# Patient Record
Sex: MALE | Race: WHITE | NOT HISPANIC OR LATINO | Employment: OTHER | URBAN - METROPOLITAN AREA
[De-identification: names, ages, dates, MRNs, and addresses within clinical notes are randomized per-mention and may not be internally consistent; named-entity substitution may affect disease eponyms.]

---

## 2022-01-01 ENCOUNTER — PATIENT OUTREACH (OUTPATIENT)
Dept: CARE COORDINATION | Facility: CLINIC | Age: 87
End: 2022-01-01

## 2022-01-01 ENCOUNTER — APPOINTMENT (OUTPATIENT)
Dept: PHYSICAL THERAPY | Facility: CLINIC | Age: 87
DRG: 194 | End: 2022-01-01
Attending: INTERNAL MEDICINE
Payer: COMMERCIAL

## 2022-01-01 ENCOUNTER — APPOINTMENT (OUTPATIENT)
Dept: SPEECH THERAPY | Facility: CLINIC | Age: 87
DRG: 194 | End: 2022-01-01
Attending: HOSPITALIST
Payer: COMMERCIAL

## 2022-01-01 ENCOUNTER — HOSPITAL ENCOUNTER (INPATIENT)
Facility: CLINIC | Age: 87
LOS: 5 days | Discharge: HOSPICE | DRG: 194 | End: 2022-08-10
Attending: EMERGENCY MEDICINE | Admitting: INTERNAL MEDICINE
Payer: COMMERCIAL

## 2022-01-01 ENCOUNTER — APPOINTMENT (OUTPATIENT)
Dept: GENERAL RADIOLOGY | Facility: CLINIC | Age: 87
DRG: 194 | End: 2022-01-01
Payer: COMMERCIAL

## 2022-01-01 ENCOUNTER — APPOINTMENT (OUTPATIENT)
Dept: MRI IMAGING | Facility: CLINIC | Age: 87
DRG: 194 | End: 2022-01-01
Attending: HOSPITALIST
Payer: COMMERCIAL

## 2022-01-01 VITALS
TEMPERATURE: 98.2 F | SYSTOLIC BLOOD PRESSURE: 146 MMHG | HEIGHT: 67 IN | HEART RATE: 69 BPM | OXYGEN SATURATION: 94 % | DIASTOLIC BLOOD PRESSURE: 106 MMHG | RESPIRATION RATE: 18 BRPM | WEIGHT: 255.29 LBS | BODY MASS INDEX: 40.07 KG/M2

## 2022-01-01 DIAGNOSIS — R09.02 HYPOXIA: ICD-10-CM

## 2022-01-01 DIAGNOSIS — Z71.89 OTHER SPECIFIED COUNSELING: ICD-10-CM

## 2022-01-01 DIAGNOSIS — Z51.5 COMFORT MEASURES ONLY STATUS: Primary | ICD-10-CM

## 2022-01-01 DIAGNOSIS — J18.9 PNEUMONIA OF LEFT LOWER LOBE DUE TO INFECTIOUS ORGANISM: ICD-10-CM

## 2022-01-01 LAB
ANION GAP SERPL CALCULATED.3IONS-SCNC: 5 MMOL/L (ref 3–14)
ANION GAP SERPL CALCULATED.3IONS-SCNC: 6 MMOL/L (ref 3–14)
ANION GAP SERPL CALCULATED.3IONS-SCNC: 6 MMOL/L (ref 3–14)
ATRIAL RATE - MUSE: 75 BPM
BASOPHILS # BLD AUTO: 0.1 10E3/UL (ref 0–0.2)
BASOPHILS NFR BLD AUTO: 1 %
BUN SERPL-MCNC: 13 MG/DL (ref 7–30)
BUN SERPL-MCNC: 14 MG/DL (ref 7–30)
BUN SERPL-MCNC: 16 MG/DL (ref 7–30)
CALCIUM SERPL-MCNC: 8.6 MG/DL (ref 8.5–10.1)
CALCIUM SERPL-MCNC: 8.8 MG/DL (ref 8.5–10.1)
CALCIUM SERPL-MCNC: 8.9 MG/DL (ref 8.5–10.1)
CHLORIDE BLD-SCNC: 109 MMOL/L (ref 94–109)
CHLORIDE BLD-SCNC: 109 MMOL/L (ref 94–109)
CHLORIDE BLD-SCNC: 110 MMOL/L (ref 94–109)
CO2 SERPL-SCNC: 24 MMOL/L (ref 20–32)
CO2 SERPL-SCNC: 26 MMOL/L (ref 20–32)
CO2 SERPL-SCNC: 27 MMOL/L (ref 20–32)
CREAT SERPL-MCNC: 0.92 MG/DL (ref 0.66–1.25)
CREAT SERPL-MCNC: 0.97 MG/DL (ref 0.66–1.25)
CREAT SERPL-MCNC: 0.98 MG/DL (ref 0.66–1.25)
CREAT SERPL-MCNC: 1.01 MG/DL (ref 0.66–1.25)
CREAT SERPL-MCNC: 1.02 MG/DL (ref 0.66–1.25)
CREAT SERPL-MCNC: 1.11 MG/DL (ref 0.66–1.25)
DIASTOLIC BLOOD PRESSURE - MUSE: NORMAL MMHG
EOSINOPHIL # BLD AUTO: 0.2 10E3/UL (ref 0–0.7)
EOSINOPHIL NFR BLD AUTO: 3 %
ERYTHROCYTE [DISTWIDTH] IN BLOOD BY AUTOMATED COUNT: 13.8 % (ref 10–15)
ERYTHROCYTE [DISTWIDTH] IN BLOOD BY AUTOMATED COUNT: 13.8 % (ref 10–15)
ERYTHROCYTE [DISTWIDTH] IN BLOOD BY AUTOMATED COUNT: 13.9 % (ref 10–15)
FLUAV RNA SPEC QL NAA+PROBE: NEGATIVE
FLUBV RNA RESP QL NAA+PROBE: NEGATIVE
GFR SERPL CREATININE-BSD FRML MDRD: 65 ML/MIN/1.73M2
GFR SERPL CREATININE-BSD FRML MDRD: 72 ML/MIN/1.73M2
GFR SERPL CREATININE-BSD FRML MDRD: 72 ML/MIN/1.73M2
GFR SERPL CREATININE-BSD FRML MDRD: 75 ML/MIN/1.73M2
GFR SERPL CREATININE-BSD FRML MDRD: 76 ML/MIN/1.73M2
GFR SERPL CREATININE-BSD FRML MDRD: 81 ML/MIN/1.73M2
GLUCOSE BLD-MCNC: 104 MG/DL (ref 70–99)
GLUCOSE BLD-MCNC: 112 MG/DL (ref 70–99)
GLUCOSE BLD-MCNC: 99 MG/DL (ref 70–99)
HCO3 BLDV-SCNC: 29 MMOL/L (ref 21–28)
HCT VFR BLD AUTO: 42.1 % (ref 40–53)
HCT VFR BLD AUTO: 42.3 % (ref 40–53)
HCT VFR BLD AUTO: 43.1 % (ref 40–53)
HGB BLD-MCNC: 13.5 G/DL (ref 13.3–17.7)
HGB BLD-MCNC: 13.8 G/DL (ref 13.3–17.7)
HGB BLD-MCNC: 13.8 G/DL (ref 13.3–17.7)
IMM GRANULOCYTES # BLD: 0.1 10E3/UL
IMM GRANULOCYTES NFR BLD: 1 %
INTERPRETATION ECG - MUSE: NORMAL
LACTATE BLD-SCNC: 0.7 MMOL/L
LYMPHOCYTES # BLD AUTO: 1.1 10E3/UL (ref 0.8–5.3)
LYMPHOCYTES NFR BLD AUTO: 15 %
MCH RBC QN AUTO: 28.3 PG (ref 26.5–33)
MCH RBC QN AUTO: 28.5 PG (ref 26.5–33)
MCH RBC QN AUTO: 28.5 PG (ref 26.5–33)
MCHC RBC AUTO-ENTMCNC: 32 G/DL (ref 31.5–36.5)
MCHC RBC AUTO-ENTMCNC: 32.1 G/DL (ref 31.5–36.5)
MCHC RBC AUTO-ENTMCNC: 32.6 G/DL (ref 31.5–36.5)
MCV RBC AUTO: 87 FL (ref 78–100)
MCV RBC AUTO: 89 FL (ref 78–100)
MCV RBC AUTO: 89 FL (ref 78–100)
MONOCYTES # BLD AUTO: 0.7 10E3/UL (ref 0–1.3)
MONOCYTES NFR BLD AUTO: 10 %
MRSA DNA SPEC QL NAA+PROBE: NEGATIVE
NEUTROPHILS # BLD AUTO: 5.3 10E3/UL (ref 1.6–8.3)
NEUTROPHILS NFR BLD AUTO: 70 %
NRBC # BLD AUTO: 0 10E3/UL
NRBC BLD AUTO-RTO: 0 /100
NT-PROBNP SERPL-MCNC: 910 PG/ML (ref 0–1800)
P AXIS - MUSE: NORMAL DEGREES
PCO2 BLDV: 38 MM HG (ref 40–50)
PH BLDV: 7.5 [PH] (ref 7.32–7.43)
PLATELET # BLD AUTO: 164 10E3/UL (ref 150–450)
PLATELET # BLD AUTO: 168 10E3/UL (ref 150–450)
PLATELET # BLD AUTO: 168 10E3/UL (ref 150–450)
PLATELET # BLD AUTO: 182 10E3/UL (ref 150–450)
PO2 BLDV: 53 MM HG (ref 25–47)
POTASSIUM BLD-SCNC: 3.7 MMOL/L (ref 3.4–5.3)
POTASSIUM BLD-SCNC: 4 MMOL/L (ref 3.4–5.3)
POTASSIUM BLD-SCNC: 4.7 MMOL/L (ref 3.4–5.3)
PR INTERVAL - MUSE: NORMAL MS
QRS DURATION - MUSE: 88 MS
QT - MUSE: 428 MS
QTC - MUSE: 465 MS
R AXIS - MUSE: -2 DEGREES
RBC # BLD AUTO: 4.74 10E6/UL (ref 4.4–5.9)
RBC # BLD AUTO: 4.84 10E6/UL (ref 4.4–5.9)
RBC # BLD AUTO: 4.87 10E6/UL (ref 4.4–5.9)
RSV RNA SPEC NAA+PROBE: NEGATIVE
SA TARGET DNA: NEGATIVE
SAO2 % BLDV: 90 % (ref 94–100)
SARS-COV-2 RNA RESP QL NAA+PROBE: NEGATIVE
SODIUM SERPL-SCNC: 139 MMOL/L (ref 133–144)
SODIUM SERPL-SCNC: 141 MMOL/L (ref 133–144)
SODIUM SERPL-SCNC: 142 MMOL/L (ref 133–144)
SYSTOLIC BLOOD PRESSURE - MUSE: NORMAL MMHG
T AXIS - MUSE: 56 DEGREES
TROPONIN I SERPL HS-MCNC: 19 NG/L
VENTRICULAR RATE- MUSE: 71 BPM
WBC # BLD AUTO: 3.9 10E3/UL (ref 4–11)
WBC # BLD AUTO: 5 10E3/UL (ref 4–11)
WBC # BLD AUTO: 7.5 10E3/UL (ref 4–11)

## 2022-01-01 PROCEDURE — 120N000001 HC R&B MED SURG/OB

## 2022-01-01 PROCEDURE — 97530 THERAPEUTIC ACTIVITIES: CPT | Mod: GP

## 2022-01-01 PROCEDURE — 250N000013 HC RX MED GY IP 250 OP 250 PS 637: Performed by: INTERNAL MEDICINE

## 2022-01-01 PROCEDURE — 83605 ASSAY OF LACTIC ACID: CPT

## 2022-01-01 PROCEDURE — 255N000002 HC RX 255 OP 636: Performed by: INTERNAL MEDICINE

## 2022-01-01 PROCEDURE — 85049 AUTOMATED PLATELET COUNT: CPT | Performed by: INTERNAL MEDICINE

## 2022-01-01 PROCEDURE — 87637 SARSCOV2&INF A&B&RSV AMP PRB: CPT

## 2022-01-01 PROCEDURE — 85027 COMPLETE CBC AUTOMATED: CPT | Performed by: INTERNAL MEDICINE

## 2022-01-01 PROCEDURE — A9585 GADOBUTROL INJECTION: HCPCS | Performed by: INTERNAL MEDICINE

## 2022-01-01 PROCEDURE — 92610 EVALUATE SWALLOWING FUNCTION: CPT | Mod: GN | Performed by: SPEECH-LANGUAGE PATHOLOGIST

## 2022-01-01 PROCEDURE — 99233 SBSQ HOSP IP/OBS HIGH 50: CPT | Performed by: HOSPITALIST

## 2022-01-01 PROCEDURE — 71046 X-RAY EXAM CHEST 2 VIEWS: CPT

## 2022-01-01 PROCEDURE — 250N000011 HC RX IP 250 OP 636: Performed by: INTERNAL MEDICINE

## 2022-01-01 PROCEDURE — 82565 ASSAY OF CREATININE: CPT | Performed by: INTERNAL MEDICINE

## 2022-01-01 PROCEDURE — 85025 COMPLETE CBC W/AUTO DIFF WBC: CPT

## 2022-01-01 PROCEDURE — C9803 HOPD COVID-19 SPEC COLLECT: HCPCS

## 2022-01-01 PROCEDURE — 97161 PT EVAL LOW COMPLEX 20 MIN: CPT | Mod: GP

## 2022-01-01 PROCEDURE — 250N000013 HC RX MED GY IP 250 OP 250 PS 637: Performed by: HOSPITALIST

## 2022-01-01 PROCEDURE — 96365 THER/PROPH/DIAG IV INF INIT: CPT

## 2022-01-01 PROCEDURE — 258N000003 HC RX IP 258 OP 636: Performed by: INTERNAL MEDICINE

## 2022-01-01 PROCEDURE — 87641 MR-STAPH DNA AMP PROBE: CPT

## 2022-01-01 PROCEDURE — 99291 CRITICAL CARE FIRST HOUR: CPT | Mod: 25

## 2022-01-01 PROCEDURE — 36415 COLL VENOUS BLD VENIPUNCTURE: CPT | Performed by: INTERNAL MEDICINE

## 2022-01-01 PROCEDURE — 99239 HOSP IP/OBS DSCHRG MGMT >30: CPT | Mod: GW | Performed by: HOSPITALIST

## 2022-01-01 PROCEDURE — 80048 BASIC METABOLIC PNL TOTAL CA: CPT | Performed by: INTERNAL MEDICINE

## 2022-01-01 PROCEDURE — 93005 ELECTROCARDIOGRAM TRACING: CPT

## 2022-01-01 PROCEDURE — 258N000003 HC RX IP 258 OP 636: Performed by: EMERGENCY MEDICINE

## 2022-01-01 PROCEDURE — 99232 SBSQ HOSP IP/OBS MODERATE 35: CPT | Performed by: HOSPITALIST

## 2022-01-01 PROCEDURE — 84484 ASSAY OF TROPONIN QUANT: CPT

## 2022-01-01 PROCEDURE — 96375 TX/PRO/DX INJ NEW DRUG ADDON: CPT

## 2022-01-01 PROCEDURE — 250N000011 HC RX IP 250 OP 636

## 2022-01-01 PROCEDURE — 82803 BLOOD GASES ANY COMBINATION: CPT

## 2022-01-01 PROCEDURE — 80048 BASIC METABOLIC PNL TOTAL CA: CPT | Performed by: HOSPITALIST

## 2022-01-01 PROCEDURE — 85018 HEMOGLOBIN: CPT | Performed by: HOSPITALIST

## 2022-01-01 PROCEDURE — 99223 1ST HOSP IP/OBS HIGH 75: CPT | Mod: AI | Performed by: INTERNAL MEDICINE

## 2022-01-01 PROCEDURE — 36415 COLL VENOUS BLD VENIPUNCTURE: CPT

## 2022-01-01 PROCEDURE — 70553 MRI BRAIN STEM W/O & W/DYE: CPT

## 2022-01-01 PROCEDURE — 80048 BASIC METABOLIC PNL TOTAL CA: CPT

## 2022-01-01 PROCEDURE — 36415 COLL VENOUS BLD VENIPUNCTURE: CPT | Performed by: HOSPITALIST

## 2022-01-01 PROCEDURE — 250N000011 HC RX IP 250 OP 636: Performed by: EMERGENCY MEDICINE

## 2022-01-01 PROCEDURE — 83880 ASSAY OF NATRIURETIC PEPTIDE: CPT

## 2022-01-01 PROCEDURE — 82565 ASSAY OF CREATININE: CPT

## 2022-01-01 RX ORDER — ALBUTEROL SULFATE 0.83 MG/ML
2.5 SOLUTION RESPIRATORY (INHALATION) EVERY 4 HOURS PRN
COMMUNITY

## 2022-01-01 RX ORDER — TAMSULOSIN HYDROCHLORIDE 0.4 MG/1
0.4 CAPSULE ORAL DAILY
COMMUNITY

## 2022-01-01 RX ORDER — ATROPINE SULFATE 10 MG/ML
2 SOLUTION/ DROPS OPHTHALMIC EVERY 4 HOURS PRN
Status: DISCONTINUED | OUTPATIENT
Start: 2022-01-01 | End: 2022-01-01 | Stop reason: HOSPADM

## 2022-01-01 RX ORDER — SALIVA STIMULANT COMB. NO.3
2 SPRAY, NON-AEROSOL (ML) MUCOUS MEMBRANE
Status: DISCONTINUED | OUTPATIENT
Start: 2022-01-01 | End: 2022-01-01 | Stop reason: HOSPADM

## 2022-01-01 RX ORDER — SENNOSIDES 8.6 MG
650 CAPSULE ORAL 3 TIMES DAILY
COMMUNITY

## 2022-01-01 RX ORDER — DIAZEPAM 10 MG/2ML
5 INJECTION, SOLUTION INTRAMUSCULAR; INTRAVENOUS EVERY 6 HOURS PRN
Status: DISCONTINUED | OUTPATIENT
Start: 2022-01-01 | End: 2022-01-01 | Stop reason: HOSPADM

## 2022-01-01 RX ORDER — ACETAMINOPHEN 325 MG/1
650 TABLET ORAL EVERY 6 HOURS PRN
Status: DISCONTINUED | OUTPATIENT
Start: 2022-01-01 | End: 2022-01-01 | Stop reason: HOSPADM

## 2022-01-01 RX ORDER — LORAZEPAM 2 MG/ML
1 CONCENTRATE ORAL
Status: DISCONTINUED | OUTPATIENT
Start: 2022-01-01 | End: 2022-01-01 | Stop reason: HOSPADM

## 2022-01-01 RX ORDER — LORAZEPAM 0.5 MG/1
0.5 TABLET ORAL EVERY 4 HOURS PRN
Status: ON HOLD | COMMUNITY
Start: 2022-01-01 | End: 2022-01-01

## 2022-01-01 RX ORDER — ENOXAPARIN SODIUM 100 MG/ML
40 INJECTION SUBCUTANEOUS EVERY 24 HOURS
Status: DISCONTINUED | OUTPATIENT
Start: 2022-01-01 | End: 2022-01-01

## 2022-01-01 RX ORDER — PRAVASTATIN SODIUM 40 MG
40 TABLET ORAL AT BEDTIME
Status: ON HOLD | COMMUNITY
End: 2022-01-01

## 2022-01-01 RX ORDER — NALOXONE HYDROCHLORIDE 0.4 MG/ML
0.1 INJECTION, SOLUTION INTRAMUSCULAR; INTRAVENOUS; SUBCUTANEOUS
Status: DISCONTINUED | OUTPATIENT
Start: 2022-01-01 | End: 2022-01-01 | Stop reason: HOSPADM

## 2022-01-01 RX ORDER — TAMSULOSIN HYDROCHLORIDE 0.4 MG/1
0.4 CAPSULE ORAL DAILY
Status: DISCONTINUED | OUTPATIENT
Start: 2022-01-01 | End: 2022-01-01 | Stop reason: HOSPADM

## 2022-01-01 RX ORDER — NITROGLYCERIN 0.4 MG/1
0.4 TABLET SUBLINGUAL EVERY 5 MIN PRN
Status: DISCONTINUED | OUTPATIENT
Start: 2022-01-01 | End: 2022-01-01 | Stop reason: HOSPADM

## 2022-01-01 RX ORDER — PIPERACILLIN SODIUM, TAZOBACTAM SODIUM 4; .5 G/20ML; G/20ML
4.5 INJECTION, POWDER, LYOPHILIZED, FOR SOLUTION INTRAVENOUS ONCE
Status: COMPLETED | OUTPATIENT
Start: 2022-01-01 | End: 2022-01-01

## 2022-01-01 RX ORDER — DONEPEZIL HYDROCHLORIDE 10 MG/1
10 TABLET, FILM COATED ORAL AT BEDTIME
Status: ON HOLD | COMMUNITY
End: 2022-01-01

## 2022-01-01 RX ORDER — ONDANSETRON 4 MG/1
4 TABLET, ORALLY DISINTEGRATING ORAL EVERY 6 HOURS PRN
Status: DISCONTINUED | OUTPATIENT
Start: 2022-01-01 | End: 2022-01-01 | Stop reason: HOSPADM

## 2022-01-01 RX ORDER — NALOXONE HYDROCHLORIDE 0.4 MG/ML
0.2 INJECTION, SOLUTION INTRAMUSCULAR; INTRAVENOUS; SUBCUTANEOUS
Status: DISCONTINUED | OUTPATIENT
Start: 2022-01-01 | End: 2022-01-01 | Stop reason: HOSPADM

## 2022-01-01 RX ORDER — AMOXICILLIN 250 MG
1 CAPSULE ORAL 2 TIMES DAILY PRN
Status: DISCONTINUED | OUTPATIENT
Start: 2022-01-01 | End: 2022-01-01 | Stop reason: HOSPADM

## 2022-01-01 RX ORDER — CARBOXYMETHYLCELLULOSE SODIUM 5 MG/ML
2 SOLUTION/ DROPS OPHTHALMIC 2 TIMES DAILY PRN
Status: DISCONTINUED | OUTPATIENT
Start: 2022-01-01 | End: 2022-01-01 | Stop reason: HOSPADM

## 2022-01-01 RX ORDER — LOPERAMIDE HCL 2 MG
2 CAPSULE ORAL 4 TIMES DAILY PRN
COMMUNITY

## 2022-01-01 RX ORDER — LORAZEPAM 1 MG/1
1 TABLET ORAL
Qty: 10 TABLET | Refills: 0 | Status: SHIPPED | OUTPATIENT
Start: 2022-01-01

## 2022-01-01 RX ORDER — BISACODYL 10 MG
10 SUPPOSITORY, RECTAL RECTAL DAILY PRN
Status: DISCONTINUED | OUTPATIENT
Start: 2022-01-01 | End: 2022-01-01 | Stop reason: HOSPADM

## 2022-01-01 RX ORDER — GADOBUTROL 604.72 MG/ML
11 INJECTION INTRAVENOUS ONCE
Status: COMPLETED | OUTPATIENT
Start: 2022-01-01 | End: 2022-01-01

## 2022-01-01 RX ORDER — NITROGLYCERIN 0.4 MG/1
0.4 TABLET SUBLINGUAL EVERY 5 MIN PRN
Status: ON HOLD | COMMUNITY
End: 2022-01-01

## 2022-01-01 RX ORDER — HYDROMORPHONE HYDROCHLORIDE 1 MG/ML
1-2 SOLUTION ORAL
Status: DISCONTINUED | OUTPATIENT
Start: 2022-01-01 | End: 2022-01-01 | Stop reason: HOSPADM

## 2022-01-01 RX ORDER — BENZTROPINE MESYLATE 1 MG/1
1 TABLET ORAL 3 TIMES DAILY PRN
Status: DISCONTINUED | OUTPATIENT
Start: 2022-01-01 | End: 2022-01-01 | Stop reason: HOSPADM

## 2022-01-01 RX ORDER — GABAPENTIN 100 MG/1
200 CAPSULE ORAL 3 TIMES DAILY
Status: DISCONTINUED | OUTPATIENT
Start: 2022-01-01 | End: 2022-01-01 | Stop reason: HOSPADM

## 2022-01-01 RX ORDER — OLANZAPINE 10 MG/2ML
2.5 INJECTION, POWDER, FOR SOLUTION INTRAMUSCULAR EVERY 6 HOURS PRN
Status: DISCONTINUED | OUTPATIENT
Start: 2022-01-01 | End: 2022-01-01 | Stop reason: HOSPADM

## 2022-01-01 RX ORDER — METOPROLOL TARTRATE 25 MG/1
25 TABLET, FILM COATED ORAL 2 TIMES DAILY
Status: DISCONTINUED | OUTPATIENT
Start: 2022-01-01 | End: 2022-01-01

## 2022-01-01 RX ORDER — GABAPENTIN 100 MG/1
100 CAPSULE ORAL 4 TIMES DAILY PRN
COMMUNITY

## 2022-01-01 RX ORDER — ACETAMINOPHEN 650 MG/1
650 SUPPOSITORY RECTAL EVERY 6 HOURS PRN
Qty: 10 SUPPOSITORY | Refills: 0 | Status: SHIPPED | OUTPATIENT
Start: 2022-01-01

## 2022-01-01 RX ORDER — ACETAMINOPHEN 650 MG/1
650 SUPPOSITORY RECTAL EVERY 6 HOURS PRN
Status: DISCONTINUED | OUTPATIENT
Start: 2022-01-01 | End: 2022-01-01 | Stop reason: HOSPADM

## 2022-01-01 RX ORDER — ONDANSETRON 2 MG/ML
4 INJECTION INTRAMUSCULAR; INTRAVENOUS EVERY 6 HOURS PRN
Status: DISCONTINUED | OUTPATIENT
Start: 2022-01-01 | End: 2022-01-01 | Stop reason: HOSPADM

## 2022-01-01 RX ORDER — PIPERACILLIN SODIUM, TAZOBACTAM SODIUM 4; .5 G/20ML; G/20ML
4.5 INJECTION, POWDER, LYOPHILIZED, FOR SOLUTION INTRAVENOUS EVERY 6 HOURS
Status: DISCONTINUED | OUTPATIENT
Start: 2022-01-01 | End: 2022-01-01

## 2022-01-01 RX ORDER — DONEPEZIL HYDROCHLORIDE 10 MG/1
10 TABLET, FILM COATED ORAL AT BEDTIME
Status: DISCONTINUED | OUTPATIENT
Start: 2022-01-01 | End: 2022-01-01

## 2022-01-01 RX ORDER — AMOXICILLIN 250 MG
2 CAPSULE ORAL 2 TIMES DAILY PRN
Status: DISCONTINUED | OUTPATIENT
Start: 2022-01-01 | End: 2022-01-01 | Stop reason: HOSPADM

## 2022-01-01 RX ORDER — CARBOXYMETHYLCELLULOSE SODIUM 5 MG/ML
2 SOLUTION/ DROPS OPHTHALMIC 3 TIMES DAILY
Status: DISCONTINUED | OUTPATIENT
Start: 2022-01-01 | End: 2022-01-01 | Stop reason: HOSPADM

## 2022-01-01 RX ORDER — ACETAMINOPHEN 325 MG/1
650 TABLET ORAL EVERY 4 HOURS PRN
Status: ON HOLD | COMMUNITY
End: 2022-01-01

## 2022-01-01 RX ORDER — PRAVASTATIN SODIUM 40 MG
40 TABLET ORAL AT BEDTIME
Status: DISCONTINUED | OUTPATIENT
Start: 2022-01-01 | End: 2022-01-01

## 2022-01-01 RX ORDER — BISACODYL 10 MG
10 SUPPOSITORY, RECTAL RECTAL DAILY PRN
Status: ON HOLD | COMMUNITY
End: 2022-01-01

## 2022-01-01 RX ORDER — ATROPINE SULFATE 10 MG/ML
2 SOLUTION/ DROPS OPHTHALMIC EVERY 4 HOURS PRN
Qty: 5 ML | Refills: 0 | Status: SHIPPED | OUTPATIENT
Start: 2022-01-01

## 2022-01-01 RX ORDER — GABAPENTIN 100 MG/1
200 CAPSULE ORAL 3 TIMES DAILY
COMMUNITY

## 2022-01-01 RX ORDER — HALOPERIDOL 2 MG/1
2 TABLET ORAL EVERY 6 HOURS PRN
Qty: 10 TABLET | Refills: 0 | Status: SHIPPED | OUTPATIENT
Start: 2022-01-01

## 2022-01-01 RX ORDER — ALBUTEROL SULFATE 0.83 MG/ML
2.5 SOLUTION RESPIRATORY (INHALATION) EVERY 4 HOURS PRN
Status: DISCONTINUED | OUTPATIENT
Start: 2022-01-01 | End: 2022-01-01 | Stop reason: HOSPADM

## 2022-01-01 RX ORDER — HYDROMORPHONE HYDROCHLORIDE 2 MG/1
1 TABLET ORAL
Qty: 20 TABLET | Refills: 0 | Status: SHIPPED | OUTPATIENT
Start: 2022-01-01

## 2022-01-01 RX ORDER — MORPHINE SULFATE 30 MG/1
2.5 TABLET ORAL EVERY 4 HOURS PRN
Status: ON HOLD | COMMUNITY
Start: 2022-01-01 | End: 2022-01-01

## 2022-01-01 RX ORDER — LIDOCAINE 40 MG/G
CREAM TOPICAL
Status: DISCONTINUED | OUTPATIENT
Start: 2022-01-01 | End: 2022-01-01 | Stop reason: HOSPADM

## 2022-01-01 RX ORDER — LEVOFLOXACIN 750 MG/1
750 TABLET, FILM COATED ORAL AT BEDTIME
Status: ON HOLD | COMMUNITY
Start: 2022-01-01 | End: 2022-01-01

## 2022-01-01 RX ORDER — POLYETHYLENE GLYCOL 3350 17 G/17G
17 POWDER, FOR SOLUTION ORAL DAILY PRN
Status: DISCONTINUED | OUTPATIENT
Start: 2022-01-01 | End: 2022-01-01 | Stop reason: HOSPADM

## 2022-01-01 RX ORDER — LACTOBACILLUS RHAMNOSUS GG 10B CELL
1 CAPSULE ORAL DAILY
Status: DISCONTINUED | OUTPATIENT
Start: 2022-01-01 | End: 2022-01-01 | Stop reason: HOSPADM

## 2022-01-01 RX ORDER — ACETAMINOPHEN 325 MG/1
650 TABLET ORAL 3 TIMES DAILY
Status: DISCONTINUED | OUTPATIENT
Start: 2022-01-01 | End: 2022-01-01 | Stop reason: HOSPADM

## 2022-01-01 RX ORDER — ALBUTEROL SULFATE 0.83 MG/ML
2.5 SOLUTION RESPIRATORY (INHALATION) 2 TIMES DAILY
Status: ON HOLD | COMMUNITY
End: 2022-01-01

## 2022-01-01 RX ORDER — GUAIFENESIN/DEXTROMETHORPHAN 100-10MG/5
10 SYRUP ORAL EVERY 4 HOURS PRN
Status: DISCONTINUED | OUTPATIENT
Start: 2022-01-01 | End: 2022-01-01 | Stop reason: HOSPADM

## 2022-01-01 RX ORDER — METOPROLOL TARTRATE 25 MG/1
25 TABLET, FILM COATED ORAL 2 TIMES DAILY
Status: ON HOLD | COMMUNITY
End: 2022-01-01

## 2022-01-01 RX ORDER — LORAZEPAM 1 MG/1
1 TABLET ORAL
Status: DISCONTINUED | OUTPATIENT
Start: 2022-01-01 | End: 2022-01-01 | Stop reason: HOSPADM

## 2022-01-01 RX ORDER — SALIVA STIMULANT COMB. NO.3
2 SPRAY, NON-AEROSOL (ML) MUCOUS MEMBRANE
Qty: 44.3 ML | Refills: 0 | Status: SHIPPED | OUTPATIENT
Start: 2022-01-01

## 2022-01-01 RX ORDER — LACTOBACILLUS RHAMNOSUS GG 10B CELL
1 CAPSULE ORAL DAILY
Status: ON HOLD | COMMUNITY
End: 2022-01-01

## 2022-01-01 RX ORDER — GABAPENTIN 100 MG/1
100 CAPSULE ORAL 4 TIMES DAILY PRN
Status: DISCONTINUED | OUTPATIENT
Start: 2022-01-01 | End: 2022-01-01 | Stop reason: HOSPADM

## 2022-01-01 RX ORDER — BISACODYL 10 MG
10 SUPPOSITORY, RECTAL RECTAL DAILY PRN
Qty: 5 SUPPOSITORY | Refills: 0 | Status: SHIPPED | OUTPATIENT
Start: 2022-01-01

## 2022-01-01 RX ORDER — HALOPERIDOL 2 MG/ML
2 SOLUTION ORAL
Status: DISCONTINUED | OUTPATIENT
Start: 2022-01-01 | End: 2022-01-01 | Stop reason: HOSPADM

## 2022-01-01 RX ADMIN — TAMSULOSIN HYDROCHLORIDE 0.4 MG: 0.4 CAPSULE ORAL at 08:28

## 2022-01-01 RX ADMIN — METOPROLOL TARTRATE 25 MG: 25 TABLET, FILM COATED ORAL at 21:10

## 2022-01-01 RX ADMIN — Medication 2 DROP: at 17:58

## 2022-01-01 RX ADMIN — PIPERACILLIN AND TAZOBACTAM 4.5 G: 4; .5 INJECTION, POWDER, FOR SOLUTION INTRAVENOUS at 11:09

## 2022-01-01 RX ADMIN — DONEPEZIL HYDROCHLORIDE 10 MG: 10 TABLET ORAL at 22:58

## 2022-01-01 RX ADMIN — ENOXAPARIN SODIUM 40 MG: 40 INJECTION SUBCUTANEOUS at 21:09

## 2022-01-01 RX ADMIN — GABAPENTIN 200 MG: 100 CAPSULE ORAL at 18:18

## 2022-01-01 RX ADMIN — ACETAMINOPHEN 650 MG: 325 TABLET ORAL at 22:58

## 2022-01-01 RX ADMIN — ENOXAPARIN SODIUM 40 MG: 40 INJECTION SUBCUTANEOUS at 21:10

## 2022-01-01 RX ADMIN — TAMSULOSIN HYDROCHLORIDE 0.4 MG: 0.4 CAPSULE ORAL at 08:44

## 2022-01-01 RX ADMIN — PIPERACILLIN AND TAZOBACTAM 4.5 G: 4; .5 INJECTION, POWDER, FOR SOLUTION INTRAVENOUS at 18:04

## 2022-01-01 RX ADMIN — ACETAMINOPHEN 650 MG: 325 TABLET ORAL at 21:28

## 2022-01-01 RX ADMIN — ACETAMINOPHEN 650 MG: 325 TABLET ORAL at 16:37

## 2022-01-01 RX ADMIN — ACETAMINOPHEN 650 MG: 325 TABLET ORAL at 18:17

## 2022-01-01 RX ADMIN — SERTRALINE HYDROCHLORIDE 50 MG: 50 TABLET ORAL at 08:29

## 2022-01-01 RX ADMIN — PIPERACILLIN AND TAZOBACTAM 4.5 G: 4; .5 INJECTION, POWDER, FOR SOLUTION INTRAVENOUS at 17:14

## 2022-01-01 RX ADMIN — PRAVASTATIN SODIUM 40 MG: 40 TABLET ORAL at 22:58

## 2022-01-01 RX ADMIN — METOPROLOL TARTRATE 25 MG: 25 TABLET, FILM COATED ORAL at 22:58

## 2022-01-01 RX ADMIN — PIPERACILLIN AND TAZOBACTAM 4.5 G: 4; .5 INJECTION, POWDER, FOR SOLUTION INTRAVENOUS at 06:17

## 2022-01-01 RX ADMIN — MINERAL OIL, PETROLATUM, PHENYLEPHRINE HCL: 2.5; 140; 749 OINTMENT TOPICAL at 21:21

## 2022-01-01 RX ADMIN — GABAPENTIN 100 MG: 100 CAPSULE ORAL at 03:49

## 2022-01-01 RX ADMIN — VANCOMYCIN HYDROCHLORIDE 750 MG: 1 INJECTION, POWDER, LYOPHILIZED, FOR SOLUTION INTRAVENOUS at 01:04

## 2022-01-01 RX ADMIN — ACETAMINOPHEN 650 MG: 325 TABLET ORAL at 08:43

## 2022-01-01 RX ADMIN — GABAPENTIN 200 MG: 100 CAPSULE ORAL at 08:28

## 2022-01-01 RX ADMIN — METOPROLOL TARTRATE 25 MG: 25 TABLET, FILM COATED ORAL at 21:09

## 2022-01-01 RX ADMIN — Medication 2 DROP: at 08:44

## 2022-01-01 RX ADMIN — PIPERACILLIN AND TAZOBACTAM 4.5 G: 4; .5 INJECTION, POWDER, FOR SOLUTION INTRAVENOUS at 23:18

## 2022-01-01 RX ADMIN — GADOBUTROL 11 ML: 604.72 INJECTION INTRAVENOUS at 17:36

## 2022-01-01 RX ADMIN — Medication 2 DROP: at 10:41

## 2022-01-01 RX ADMIN — Medication 2 DROP: at 21:09

## 2022-01-01 RX ADMIN — PIPERACILLIN AND TAZOBACTAM 4.5 G: 4; .5 INJECTION, POWDER, FOR SOLUTION INTRAVENOUS at 05:19

## 2022-01-01 RX ADMIN — ACETAMINOPHEN 650 MG: 325 TABLET ORAL at 10:41

## 2022-01-01 RX ADMIN — MINERAL OIL, PETROLATUM, PHENYLEPHRINE HCL: 2.5; 140; 749 OINTMENT TOPICAL at 22:15

## 2022-01-01 RX ADMIN — PIPERACILLIN AND TAZOBACTAM 4.5 G: 4; .5 INJECTION, POWDER, FOR SOLUTION INTRAVENOUS at 22:16

## 2022-01-01 RX ADMIN — METOPROLOL TARTRATE 25 MG: 25 TABLET, FILM COATED ORAL at 08:41

## 2022-01-01 RX ADMIN — GABAPENTIN 200 MG: 100 CAPSULE ORAL at 22:58

## 2022-01-01 RX ADMIN — GABAPENTIN 200 MG: 100 CAPSULE ORAL at 10:41

## 2022-01-01 RX ADMIN — Medication 1 CAPSULE: at 08:03

## 2022-01-01 RX ADMIN — GABAPENTIN 200 MG: 100 CAPSULE ORAL at 21:08

## 2022-01-01 RX ADMIN — DONEPEZIL HYDROCHLORIDE 10 MG: 10 TABLET ORAL at 21:09

## 2022-01-01 RX ADMIN — MINERAL OIL, PETROLATUM, PHENYLEPHRINE HCL: 2.5; 140; 749 OINTMENT TOPICAL at 11:13

## 2022-01-01 RX ADMIN — SERTRALINE HYDROCHLORIDE 50 MG: 50 TABLET ORAL at 08:04

## 2022-01-01 RX ADMIN — SERTRALINE HYDROCHLORIDE 50 MG: 50 TABLET ORAL at 10:42

## 2022-01-01 RX ADMIN — PIPERACILLIN AND TAZOBACTAM 4.5 G: 4; .5 INJECTION, POWDER, FOR SOLUTION INTRAVENOUS at 18:10

## 2022-01-01 RX ADMIN — LORAZEPAM 1 MG: 1 TABLET ORAL at 06:06

## 2022-01-01 RX ADMIN — GABAPENTIN 200 MG: 100 CAPSULE ORAL at 21:10

## 2022-01-01 RX ADMIN — VANCOMYCIN HYDROCHLORIDE 2500 MG: 5 INJECTION, POWDER, LYOPHILIZED, FOR SOLUTION INTRAVENOUS at 11:52

## 2022-01-01 RX ADMIN — Medication 2 DROP: at 08:04

## 2022-01-01 RX ADMIN — DONEPEZIL HYDROCHLORIDE 10 MG: 10 TABLET ORAL at 22:14

## 2022-01-01 RX ADMIN — GABAPENTIN 200 MG: 100 CAPSULE ORAL at 08:41

## 2022-01-01 RX ADMIN — MINERAL OIL, PETROLATUM, PHENYLEPHRINE HCL: 2.5; 140; 749 OINTMENT TOPICAL at 22:22

## 2022-01-01 RX ADMIN — ENOXAPARIN SODIUM 40 MG: 40 INJECTION SUBCUTANEOUS at 23:00

## 2022-01-01 RX ADMIN — ACETAMINOPHEN 650 MG: 325 TABLET ORAL at 22:14

## 2022-01-01 RX ADMIN — Medication 2 DROP: at 08:40

## 2022-01-01 RX ADMIN — GABAPENTIN 200 MG: 100 CAPSULE ORAL at 22:15

## 2022-01-01 RX ADMIN — GABAPENTIN 200 MG: 100 CAPSULE ORAL at 21:26

## 2022-01-01 RX ADMIN — Medication 2 DROP: at 08:29

## 2022-01-01 RX ADMIN — PRAVASTATIN SODIUM 40 MG: 40 TABLET ORAL at 22:14

## 2022-01-01 RX ADMIN — PIPERACILLIN AND TAZOBACTAM 4.5 G: 4; .5 INJECTION, POWDER, FOR SOLUTION INTRAVENOUS at 10:49

## 2022-01-01 RX ADMIN — VANCOMYCIN HYDROCHLORIDE 750 MG: 1 INJECTION, POWDER, LYOPHILIZED, FOR SOLUTION INTRAVENOUS at 12:46

## 2022-01-01 RX ADMIN — MINERAL OIL, PETROLATUM, PHENYLEPHRINE HCL: 2.5; 140; 749 OINTMENT TOPICAL at 08:44

## 2022-01-01 RX ADMIN — METOPROLOL TARTRATE 25 MG: 25 TABLET, FILM COATED ORAL at 22:14

## 2022-01-01 RX ADMIN — Medication 2 DROP: at 21:28

## 2022-01-01 RX ADMIN — TAMSULOSIN HYDROCHLORIDE 0.4 MG: 0.4 CAPSULE ORAL at 08:41

## 2022-01-01 RX ADMIN — ACETAMINOPHEN 650 MG: 325 TABLET ORAL at 18:04

## 2022-01-01 RX ADMIN — Medication 2 DROP: at 18:03

## 2022-01-01 RX ADMIN — GABAPENTIN 200 MG: 100 CAPSULE ORAL at 16:37

## 2022-01-01 RX ADMIN — PIPERACILLIN AND TAZOBACTAM 4.5 G: 4; .5 INJECTION, POWDER, FOR SOLUTION INTRAVENOUS at 23:50

## 2022-01-01 RX ADMIN — SERTRALINE HYDROCHLORIDE 50 MG: 50 TABLET ORAL at 08:44

## 2022-01-01 RX ADMIN — Medication 1 CAPSULE: at 08:44

## 2022-01-01 RX ADMIN — SERTRALINE HYDROCHLORIDE 50 MG: 50 TABLET ORAL at 08:41

## 2022-01-01 RX ADMIN — PIPERACILLIN AND TAZOBACTAM 4.5 G: 4; .5 INJECTION, POWDER, FOR SOLUTION INTRAVENOUS at 22:58

## 2022-01-01 RX ADMIN — Medication 2 DROP: at 21:10

## 2022-01-01 RX ADMIN — ACETAMINOPHEN 650 MG: 325 TABLET ORAL at 15:36

## 2022-01-01 RX ADMIN — ACETAMINOPHEN 650 MG: 325 TABLET ORAL at 17:58

## 2022-01-01 RX ADMIN — Medication 2 DROP: at 22:15

## 2022-01-01 RX ADMIN — DONEPEZIL HYDROCHLORIDE 10 MG: 10 TABLET ORAL at 21:10

## 2022-01-01 RX ADMIN — PIPERACILLIN AND TAZOBACTAM 4.5 G: 4; .5 INJECTION, POWDER, FOR SOLUTION INTRAVENOUS at 11:44

## 2022-01-01 RX ADMIN — PRAVASTATIN SODIUM 40 MG: 40 TABLET ORAL at 21:10

## 2022-01-01 RX ADMIN — ACETAMINOPHEN 650 MG: 325 TABLET ORAL at 08:03

## 2022-01-01 RX ADMIN — GABAPENTIN 200 MG: 100 CAPSULE ORAL at 15:36

## 2022-01-01 RX ADMIN — VANCOMYCIN HYDROCHLORIDE 750 MG: 1 INJECTION, POWDER, LYOPHILIZED, FOR SOLUTION INTRAVENOUS at 01:43

## 2022-01-01 RX ADMIN — Medication 1 CAPSULE: at 08:28

## 2022-01-01 RX ADMIN — PIPERACILLIN AND TAZOBACTAM 4.5 G: 4; .5 INJECTION, POWDER, FOR SOLUTION INTRAVENOUS at 17:59

## 2022-01-01 RX ADMIN — GABAPENTIN 200 MG: 100 CAPSULE ORAL at 18:04

## 2022-01-01 RX ADMIN — PRAVASTATIN SODIUM 40 MG: 40 TABLET ORAL at 21:09

## 2022-01-01 RX ADMIN — Medication 1 CAPSULE: at 08:41

## 2022-01-01 RX ADMIN — Medication 2 DROP: at 17:59

## 2022-01-01 RX ADMIN — METOPROLOL TARTRATE 25 MG: 25 TABLET, FILM COATED ORAL at 08:44

## 2022-01-01 RX ADMIN — TAMSULOSIN HYDROCHLORIDE 0.4 MG: 0.4 CAPSULE ORAL at 10:42

## 2022-01-01 RX ADMIN — PIPERACILLIN AND TAZOBACTAM 4.5 G: 4; .5 INJECTION, POWDER, FOR SOLUTION INTRAVENOUS at 11:11

## 2022-01-01 RX ADMIN — GABAPENTIN 200 MG: 100 CAPSULE ORAL at 08:43

## 2022-01-01 RX ADMIN — LORAZEPAM 1 MG: 1 TABLET ORAL at 13:39

## 2022-01-01 RX ADMIN — METOPROLOL TARTRATE 25 MG: 25 TABLET, FILM COATED ORAL at 10:42

## 2022-01-01 RX ADMIN — PIPERACILLIN AND TAZOBACTAM 4.5 G: 4; .5 INJECTION, POWDER, FOR SOLUTION INTRAVENOUS at 05:06

## 2022-01-01 RX ADMIN — METOPROLOL TARTRATE 25 MG: 25 TABLET, FILM COATED ORAL at 08:03

## 2022-01-01 RX ADMIN — MINERAL OIL, PETROLATUM, PHENYLEPHRINE HCL: 2.5; 140; 749 OINTMENT TOPICAL at 08:42

## 2022-01-01 RX ADMIN — ACETAMINOPHEN 650 MG: 325 TABLET ORAL at 21:09

## 2022-01-01 RX ADMIN — Medication 2 DROP: at 18:18

## 2022-01-01 RX ADMIN — MINERAL OIL, PETROLATUM, PHENYLEPHRINE HCL: 2.5; 140; 749 OINTMENT TOPICAL at 21:09

## 2022-01-01 RX ADMIN — PIPERACILLIN AND TAZOBACTAM 4.5 G: 4; .5 INJECTION, POWDER, FOR SOLUTION INTRAVENOUS at 05:07

## 2022-01-01 RX ADMIN — TAMSULOSIN HYDROCHLORIDE 0.4 MG: 0.4 CAPSULE ORAL at 08:04

## 2022-01-01 RX ADMIN — GABAPENTIN 200 MG: 100 CAPSULE ORAL at 17:58

## 2022-01-01 RX ADMIN — GABAPENTIN 200 MG: 100 CAPSULE ORAL at 08:04

## 2022-01-01 RX ADMIN — ACETAMINOPHEN 650 MG: 325 TABLET ORAL at 08:28

## 2022-01-01 RX ADMIN — Medication 2 DROP: at 15:37

## 2022-01-01 RX ADMIN — ENOXAPARIN SODIUM 40 MG: 40 INJECTION SUBCUTANEOUS at 22:15

## 2022-01-01 RX ADMIN — ACETAMINOPHEN 650 MG: 325 TABLET ORAL at 08:00

## 2022-01-01 RX ADMIN — Medication 1 CAPSULE: at 10:41

## 2022-01-01 ASSESSMENT — ACTIVITIES OF DAILY LIVING (ADL)
ADLS_ACUITY_SCORE: 47
ADLS_ACUITY_SCORE: 55
ADLS_ACUITY_SCORE: 49
ADLS_ACUITY_SCORE: 55
ADLS_ACUITY_SCORE: 51
ADLS_ACUITY_SCORE: 51
ADLS_ACUITY_SCORE: 55
ADLS_ACUITY_SCORE: 51
ADLS_ACUITY_SCORE: 53
ADLS_ACUITY_SCORE: 51
ADLS_ACUITY_SCORE: 51
ADLS_ACUITY_SCORE: 47
ADLS_ACUITY_SCORE: 53
ADLS_ACUITY_SCORE: 57
ADLS_ACUITY_SCORE: 55
ADLS_ACUITY_SCORE: 55
ADLS_ACUITY_SCORE: 51
ADLS_ACUITY_SCORE: 57
ADLS_ACUITY_SCORE: 47
ADLS_ACUITY_SCORE: 55
ADLS_ACUITY_SCORE: 47
ADLS_ACUITY_SCORE: 35
ADLS_ACUITY_SCORE: 55
ADLS_ACUITY_SCORE: 47
ADLS_ACUITY_SCORE: 57
ADLS_ACUITY_SCORE: 51
ADLS_ACUITY_SCORE: 47
ADLS_ACUITY_SCORE: 55
ADLS_ACUITY_SCORE: 55
ADLS_ACUITY_SCORE: 35
ADLS_ACUITY_SCORE: 45
ADLS_ACUITY_SCORE: 43
DEPENDENT_IADLS:: CLEANING;COOKING;LAUNDRY;SHOPPING;MEAL PREPARATION;MEDICATION MANAGEMENT;MONEY MANAGEMENT;TRANSPORTATION
ADLS_ACUITY_SCORE: 55
ADLS_ACUITY_SCORE: 51
ADLS_ACUITY_SCORE: 55
ADLS_ACUITY_SCORE: 51
ADLS_ACUITY_SCORE: 51
ADLS_ACUITY_SCORE: 57
ADLS_ACUITY_SCORE: 55
ADLS_ACUITY_SCORE: 55
ADLS_ACUITY_SCORE: 51
ADLS_ACUITY_SCORE: 47
ADLS_ACUITY_SCORE: 43
ADLS_ACUITY_SCORE: 55
ADLS_ACUITY_SCORE: 55
ADLS_ACUITY_SCORE: 51
ADLS_ACUITY_SCORE: 43
ADLS_ACUITY_SCORE: 55

## 2022-01-01 ASSESSMENT — ENCOUNTER SYMPTOMS: SHORTNESS OF BREATH: 1

## 2022-08-05 PROBLEM — R09.02 HYPOXIA: Status: ACTIVE | Noted: 2022-01-01

## 2022-08-05 PROBLEM — J18.9 PNEUMONIA OF LEFT LOWER LOBE DUE TO INFECTIOUS ORGANISM: Status: ACTIVE | Noted: 2022-01-01

## 2022-08-05 NOTE — PHARMACY-VANCOMYCIN DOSING SERVICE
"Pharmacy Vancomycin Initial Note  Date of Service 2022  Patient's  1935  87 year old, male    Indication: Community Acquired Pneumonia    Current estimated CrCl = Estimated Creatinine Clearance: 69.5 mL/min (based on SCr of 0.92 mg/dL).    Creatinine for last 3 days  2022:  9:30 AM Creatinine 0.92 mg/dL    Recent Vancomycin Level(s) for last 3 days  No results found for requested labs within last 72 hours.      Vancomycin IV Administrations (past 72 hours)                   vancomycin 2500 mg in 0.9% NaCl 500 ml intermittent infusion 2,500 mg (mg) 2,500 mg New Bag 22 1152                Nephrotoxins and other renal medications (From now, onward)    Start     Dose/Rate Route Frequency Ordered Stop    22 1700  piperacillin-tazobactam (ZOSYN) 4.5 g vial to attach to  mL bag        Note to Pharmacy: For [facility], [facility] and [facility]: For Zosyn-naive patients, use the \"Zosyn initial dose + extended infusion\" order panel.    4.5 g  over 30 Minutes Intravenous EVERY 6 HOURS 22 1303 22 1659          Contrast Orders - past 72 hours (72h ago, onward)    None          [employer] Prediction of Planned Initial Vancomycin Regimen    Loading dose: 2500 mg over 2 hours   Regimen: 750 mg IV every 12 hours.  Start time: 15:31 on 2022  Exposure target: AUC24 (range)400-600 mg/L.hr   AUC24,ss: 417 mg/L.hr  Probability of AUC24 > 400: 54 %  Ctrough,ss: 14.3 mg/L  Probability of Ctrough,ss > 20: 21 %  Probability of nephrotoxicity (Lodise ELOINA ): 9 %          Plan:  1. Start vancomycin  750 mg IV q12h.   2. Vancomycin monitoring method: AUC  3. Vancomycin therapeutic monitoring goal: 400-600 mg*h/L  4. Pharmacy will check vancomycin levels as appropriate in 1-3 Days.    5. Serum creatinine levels will be ordered daily for the first week of therapy and at least twice weekly for subsequent weeks.      Nancy Said, [facility]  "

## 2022-08-05 NOTE — ED TRIAGE NOTES
Pt presents from memory care with SOB for the last 3 days.  Per EMS pt was diagnosed with pneumonia 1 weeks prior and started on z-pac.  Pt confused and non-ambulatory at baseline per EMS.     Triage Assessment     Row Name 08/05/22 0915       Triage Assessment (Adult)    Airway WDL X  Pt reports feeling SOB, O2 sats 88% on RA, pt placed on 3 liter via NC.       Respiratory WDL    Respiratory WDL WDL       Skin Circulation/Temperature WDL    Skin Circulation/Temperature WDL WDL       Cardiac WDL    Cardiac WDL WDL       Peripheral/Neurovascular WDL    Peripheral Neurovascular WDL WDL       Cognitive/Neuro/Behavioral WDL    Cognitive/Neuro/Behavioral WDL WDL

## 2022-08-05 NOTE — ED PROVIDER NOTES
ED ATTENDING PHYSICIAN NOTE:   I evaluated this patient in conjunction with Elina Jauregui PA-C  I have participated in the care of the patient and personally performed key elements of the history, exam, and medical decision making.      HPI:   Oscar Eric is a 87 year old male who presents via EMS from his living facility ([facility]) for evaluation of worsening shortness of breath.  The patient has Alzheimer dementia and is confused at baseline.  History is limited as he is a poor historian. According to EMS he was treated with a Z-Bruce for a pneumonia diagnosis that he received about a week ago. His living facility notes that his respiratory function has been declining and he needed 2 L of nasal cannula today. He is not on oxygen at baseline.     EXAM:     General: Alert, No distress. Nontoxic appearance  Head: No signs of trauma.   Mouth/Throat: Oropharynx moist.   Eyes: Conjunctivae are normal. Pupils are equal.  Neck: Normal range of motion.    CV: Appears well perfused.  Resp: Nasal cannula oxygen today.  MSK: Normal range of motion. No obvious deformity.   Neuro:  Baseline dementia. GOMEZ. Speech normal. GCS 15  Skin: No lesion or sign of trauma noted.   Psych: normal mood and affect. behavior is normal.       MEDICAL DECISION MAKING/ASSESSMENT AND PLAN:   Agree with admission for IV antibiotics after failing outpatient therapy for pneumonia.    No signs of sepsis  Supplemental oxygen  Shared decision making with family guarding inpatient versus outpatient treatment       DIAGNOSIS:     ICD-10-CM    1. Pneumonia of left lower lobe due to infectious organism  J18.9    2. Hypoxia  R09.02        DISPOSITION:    Admit to the hospitalist group, Dr. Vergara     Scribe Disclosure:  I, Thor Monroe, am serving as a scribe at 9:14 AM on 8/5/2022 to document services personally performed by Aniceto Noland MD based on my observations and the provider's statements to me.       Aniceto Noland MD  08/05/22  1124

## 2022-08-05 NOTE — ED NOTES
[facility]  ED Nurse Handoff Report    ED Chief complaint: Shortness of Breath      ED Diagnosis:   Final diagnoses:   None       Code Status: Comfort Care    Allergies: No Known Allergies    Patient Story: Pt presents from [facility] after being diagnosed with pneumonia 1 week ago and requiring 2 liter left eye via NC to maintain his sats for the last 3 days.  Pt is bedbound and confused at baseline.    Focused Assessment:  Pt presents from [facility] with SOB requiring o2 for the last 3 days.  Pt has Alzheimer's and is alert to self only, per EMS this is his baseline.  O2 sats in the high 90s on 2 liters O2 via NC.  Plans for IV abx and admission.  Provider spoke t the pt's daughter.      Treatments and/or interventions provided: NA  Patient's response to treatments and/or interventions: NA    To be done/followed up on inpatient unit:  NA    Does this patient have any cognitive concerns?: Disoriented to time, Disoriented to place and Disoriented to situation    Activity level - Baseline/Home:  Total Care  Activity Level - Current:   Total Care    Patient's Preferred language: English   Needed?: No    Isolation: None  Infection: Not Applicable  Patient tested for COVID 19 prior to admission: YES  Bariatric?: No    Vital Signs:   Vitals:    08/05/22 0914 08/05/22 0918   BP: 117/68    Pulse:  79   Resp: 20    Temp: 98.6  F (37  C)    TempSrc: Temporal    SpO2: (!) 88%        Cardiac Rhythm:  a-fib    Was the PSS-3 completed:   Yes  What interventions are required if any?               Family Comments: Provider spoke to daughter  OBS brochure/video discussed/provided to patient/family: N/A              Name of person given brochure if not patient: NA              Relationship to patient: NA    For the majority of the shift this patient's behavior was Green.   Behavioral interventions performed were NA.    ED NURSE PHONE NUMBER: *[phone]

## 2022-08-05 NOTE — H&P
[facility]    History and Physical - Hospitalist Service       Date of Admission:  8/5/2022    Assessment & Plan      Oscar Eric is a 87 year old male with history of advanced Alzheimer's disease living in a nursing home, atrial fibrillation rate controlled with metoprolol not on anticoagulation, coronary artery disease status post a previous stent, hypertension, hyperlipidemia and recent diagnosis of pneumonia treated with outpatient antibiotics who presents via EMS from his living facility ([facility]) for evaluation of worsening shortness of breath and newly requiring oxygen.     Acute respiratory failure.  Nursing home acquired left lower lobe lobe pneumonia with risk factors for Pseudomonas and MRSA failing outpatient antibiotics including azithromycin, cephalosporin, and Levaquin.  Chest x-ray shows left lower lobe pneumonia with some right upper lobe scarring.  Started azithromycin and cephalosporin on July 28 and completed azithromycin course on 8/1 and recently had cephalosporin changed to Levaquin.  -Admit to inpatient status  -IV Zosyn and vancomycin  -Continue lactobacillus for C. difficile prevention  -Albuterol nebs every 4 hours as needed  -Oxygen to keep O2 sats greater than 90%, wean as tolerated    Advanced dementia.  Knows person and occasionally place at baseline with difficulty remembering recent events.  -Continue home Aricept  -Continue home Zoloft and scheduled/as needed Neurontin  -Avoid Ativan while in the hospital because of risk of delirium  -Monitor for delirium    Chronic atrial fibrillation with controlled ventricular rate and no anticoagulation.  -Patient avoiding anticoagulation at baseline  -Continue home metoprolol with parameters    Benign essential hypertension.  -Continue home metoprolol with parameters    Benign prostatic hypertrophy.  -Continue home Flomax    Mixed hyperlipidemia.  -Continue home Pravachol    History of coronary artery disease  with previous stent.  Daughter says she does not believe he has had a heart attack in the past but had a stent placed because of some chest pains and coronary disease.  -Continue metoprolol, Pravachol and as needed nitroglycerin  -Patient is not on aspirin and can address this with his primary    Chronic arthritis pains.  -Continued scheduled Tylenol 3 times daily with as needed Tylenol    Dry eyes.  -Continue artificial tears       Diet:  Regular diet  DVT Prophylaxis: Enoxaparin (Lovenox) SQ and Pneumatic Compression Devices  Liao Catheter: Not present  Central Lines: None  Cardiac Monitoring: None  Code Status:  DNR/DNI, discussed goals of care and would consider trial of BiPAP if needed but would like to be called if worsening    Clinically Significant Risk Factors Present on Admission                          Disposition Plan      Expected Discharge Date: 08/08/2022        Will likely return to nursing home.        The patient's care was discussed with the Patient, Patient's Family and ED provider.    Jason Vergara MD  Hospitalist Service  [facility]  Securely message with the Vocera Web Console (learn more here)  Text page via [employer] Paging/Directory   ______________________________________________________________________    Chief Complaint   Shortness of breath and cough    History is obtained from the patient, electronic health record, emergency department physician and patient's daughter    History of Present Illness   Oscar Eric is a 87 year old male with history of advanced Alzheimer's disease living in a nursing home, atrial fibrillation rate controlled with metoprolol not on anticoagulation, coronary artery disease status post a previous stent, hypertension, hyperlipidemia and recent diagnosis of pneumonia treated with outpatient antibiotics who presents via EMS from his living facility ([facility]) for evaluation of worsening shortness of breath and newly requiring  oxygen.  The patient has Alzheimer dementia and is confused at baseline.  History is limited as he is a poor historian.   He was treated with a Z-Bruce which he finished on August 1 and is currently on cephalosporin with plans to finish his course on August 8 with recent change to Levaquin. His living facility notes that his respiratory function has been declining and he needed 2 L of nasal cannula today. He is not on oxygen at baseline.  His CODE STATUS is DNR with comfort focus but when discussing options of sending him home with Levaquin and doxycycline or admitting him for IV antibiotics the family decided to admit the patient for IV antibiotics with the goal of getting him through the pneumonia and back to the nursing home.  Patient is not aware of any of his medical problems and says he is doing well at the nursing home but does not remember the details of what happened yesterday although he is able to tell me he is in the hospital.  I talked with his daughter Slime about goals of care and she does want to try IV antibiotics and would consider a trial of BiPAP if needed but does not want intubation or resuscitation.  If patient worsens and would need BiPAP she wants us to try it and then call her to see if he tolerates it.  She is hopeful that he will improve with IV antibiotics and oxygen and get back to his previous standard of living at the nursing home.  His other daughter is a nurse and is helping with the decisions as well.    Review of Systems    Review of systems not obtained due to patient factors - confusion    Past Medical History    I have reviewed this patient's medical history and updated it with pertinent information if needed.   Past Medical History:   Diagnosis Date     Alzheimer's dementia with behavioral disturbance (H)     Advanced     Benign essential hypertension      Benign prostatic hyperplasia with weak urinary stream      Chronic atrial fibrillation (H)     Not on anticoagulation      Coronary artery disease involving native coronary artery of native heart without angina pectoris     Prior cardiac stent     Mixed hyperlipidemia        Past Surgical History   I have reviewed this patient's surgical history and updated it with pertinent information if needed.  Past Surgical History:   Procedure Laterality Date     SD REPAIR ROTATOR CUFF,ACUTE       Right Total Knee Arthroplasty         Social History   I have reviewed this patient's social history and updated it with pertinent information if needed.  Social History     Tobacco Use     Smoking status: Former Smoker     Quit date:      Years since quittin.6   Substance Use Topics     Alcohol use: Not Currently     Drug use: Never       Family History   Unable to obtain due to: dementia    Prior to Admission Medications   Prior to Admission Medications   Prescriptions Last Dose Informant Patient Reported? Taking?   LORazepam (ATIVAN) 0.5 MG tablet   Yes Yes   Sig: Take 0.5 mg by mouth every 4 hours as needed for anxiety (restlessness)   Petrolatum OINT 2022 at 0800  Yes Yes   Sig: Externally apply topically 2 times daily Elbows and bilateral legs   acetaminophen (ARTHRITIS PAIN APAP) 650 MG CR tablet 2022 at 0800  Yes Yes   Sig: Take 650 mg by mouth 3 times daily   acetaminophen (TYLENOL) 325 MG tablet   Yes Yes   Sig: Take 650 mg by mouth every 4 hours as needed for mild pain   albuterol (PROVENTIL) (2.5 MG/3ML) 0.083% neb solution 2022 at 0800  Yes Yes   Sig: Take 2.5 mg by nebulization 2 times daily   albuterol (PROVENTIL) (2.5 MG/3ML) 0.083% neb solution   Yes Yes   Sig: Take 2.5 mg by nebulization every 4 hours as needed for shortness of breath / dyspnea or wheezing   bisacodyl (DULCOLAX) 10 MG suppository   Yes Yes   Sig: Place 10 mg rectally daily as needed for constipation   cholecalciferol (VITAMIN D3) 125 mcg (5000 units) capsule 2022 at 0800  Yes Yes   Sig: Take 125 mcg by mouth daily   donepezil (ARICEPT) 10 MG  tablet 8/4/2022 at 2000  Yes Yes   Sig: Take 10 mg by mouth At Bedtime   gabapentin (NEURONTIN) 100 MG capsule 8/5/2022 at 0800  Yes Yes   Sig: Take 200 mg by mouth 3 times daily   gabapentin (NEURONTIN) 100 MG capsule   Yes Yes   Sig: Take 100 mg by mouth 4 times daily as needed (agitation/anxiety)   hypromellose (ARTIFICIAL TEARS) 0.5 % SOLN ophthalmic solution 8/5/2022 at 0800  Yes Yes   Sig: Place 2 drops into both eyes 3 times daily   hypromellose (ARTIFICIAL TEARS) 0.5 % SOLN ophthalmic solution   Yes Yes   Sig: Place 2 drops into both eyes 2 times daily as needed for dry eyes   lactobacillus rhamnosus, GG, (CULTURELLE KIDS) packet 8/5/2022 at 0800  Yes Yes   Sig: Take 1 packet by mouth daily   levofloxacin (LEVAQUIN) 750 MG tablet 8/4/2022 at 2000  Yes Yes   Sig: Take 750 mg by mouth At Bedtime   loperamide (IMODIUM) 2 MG capsule   Yes Yes   Sig: Take 2 mg by mouth 4 times daily as needed for diarrhea   magnesium hydroxide (MILK OF MAGNESIA) 400 MG/5ML suspension   Yes Yes   Sig: Take 15-30 mLs by mouth daily as needed for constipation or heartburn   menthol-zinc oxide (CALMOSEPTINE) 0.44-20.6 % OINT ointment   Yes Yes   Sig: Apply topically 3 times daily as needed for irritation (redness)   metoprolol tartrate (LOPRESSOR) 25 MG tablet 8/5/2022 at 0800  Yes Yes   Sig: Take 25 mg by mouth 2 times daily   morphine 2.5 MG solu-tab   Yes Yes   Sig: Take 2.5 mg by mouth every 4 hours as needed for moderate to severe pain or shortness of breath / dyspnea   nitroGLYcerin (NITROSTAT) 0.4 MG sublingual tablet   Yes Yes   Sig: Place 0.4 mg under the tongue every 5 minutes as needed for chest pain For chest pain place 1 tablet under the tongue every 5 minutes for 3 doses. If symptoms persist 5 minutes after 1st dose call 911.   phenylephrine-mineral oil-petrolatum (PREPARATION H) 0.25-14-74.9 % rectal ointment 8/5/2022 at 0800  Yes Yes   Sig: Place rectally 2 times daily   pravastatin (PRAVACHOL) 40 MG tablet 8/4/2022  at 2000  Yes Yes   Sig: Take 40 mg by mouth At Bedtime   sertraline (ZOLOFT) 50 MG tablet 8/5/2022 at 0800  Yes Yes   Sig: Take 50 mg by mouth daily   tamsulosin (FLOMAX) 0.4 MG capsule 8/5/2022 at 0800  Yes Yes   Sig: Take 0.4 mg by mouth daily      Facility-Administered Medications: None     Allergies   No Known Allergies    Physical Exam   Vital Signs: Temp: 98.6  F (37  C) Temp src: Temporal BP: 102/80 Pulse: 67   Resp: 11 SpO2: 95 %      Weight: 0 lbs 0 oz  Constitutional: Awake, alert, cooperative, mild respiratory distress.  Eyes: Conjunctiva and pupils examined and normal.  HEENT: Moist mucous membranes, normal dentition.  Respiratory: Crackles in the right upper lobe and bilateral lower lobes with bronchial breath sounds and end expiratory wheezing in the right upper lobe  Cardiovascular: Regular rate and irregularly irregular rhythm, normal S1 and S2, and no murmur noted.  GI: Soft, non-distended, non-tender, normal bowel sounds.  Lymph/Hematologic: No anterior cervical or supraclavicular adenopathy.  Skin: No rashes, no cyanosis, no edema.  Musculoskeletal: No joint swelling, erythema or tenderness.  Neurologic: Cranial nerves 2-12 intact, normal strength and sensation.  Psychiatric: Alert, oriented to person and hospital but not year, no obvious anxiety or depression.      Data   Data reviewed today: I reviewed all medications, new labs and imaging results over the last 24 hours. I personally reviewed the EKG tracing showing atrial fibrillation with controlled ventricular rate and no significant ischemic changes.    Recent Labs   Lab 08/05/22  0930   WBC 7.5   HGB 13.8   MCV 87         POTASSIUM 4.7   CHLORIDE 109   CO2 24   BUN 16   CR 0.92   ANIONGAP 6   BRITTNY 8.9   *     Recent Results (from the past 24 hour(s))   Chest XR,  PA & LAT    Narrative    XR CHEST 2 VIEWS 8/5/2022 10:03 AM    HISTORY: Dyspnea, hx of pneumonia    COMPARISON: None available      Impression    IMPRESSION:  Linear and nodular opacity in the right upper lobe may be  due to scarring. Correlation with prior radiographs can be considered  when available or a follow-up chest CT in 8-12 weeks can be  considered. Mild left basilar patchy opacity, either atelectasis or  pneumonia. No pleural effusion or pneumothorax. Mildly enlarged  cardiac silhouette and tortuous thoracic aorta. Right humerus  postoperative changes.    LENA FRANZ MD         SYSTEM ID:  [id number]

## 2022-08-05 NOTE — ED PROVIDER NOTES
"  History     Chief Complaint:  Shortness of Breath      HPI   Oscar Eric is a 87 year old male with a history of Alzheimer dementia, atrial fibrillation (rate controlled on metoprolol, not anticoagulated), CAD, hypertension and hyperlipidemia who presents by ambulance from his living facility [facility] for evaluation of worsening shortness of breath.  The patient has Alzheimer dementia and is confused at baseline.  History is limited as he is a poor historian.  According to EMS, he developed pneumonia about a week ago and has been on antibiotics.    He finished a Z-Bruce 8/1  He finished cefpodoxime 8/3  He started Levaquin 8/4    His living facility states his respiratory status has been declining and he needed 2 L of nasal cannula today.  He is not on oxygen at baseline.  He is bedbound at baseline.  The patient can only tell me \"hard to breathe.\"  On review of the patient's MAR from [facility], I was able to obtain the medication list below.  His POLST states that he is DNR and comfort focused treatment only is indicated.    Review of Systems   Unable to perform ROS: Dementia   Respiratory: Positive for shortness of breath.      Allergies:  NKDA    Medications:    Gabapentin  Morphine  Ativan  Levaquin  Furosemide  Sertraline  Tamsulosin  Vitamin D3  Metoprolol  Donezepil  Pravastatin  Z-Bruce  Cefpodoxime  Culturelle  Tylenol  Imodium  Acetaminophen  Nitroglycerin  Albuterol neb    Past Medical History:    CAD  Hypertension  Hyperlipidemia  Pneumonia  Alzheimer disease    Past Surgical History:    No past surgical history on file    Family History:    No past family history on file    Social History:  Patient came from EMS  [facility] resident  PCP: Dr. Monroe    Physical Exam     Patient Vitals for the past 24 hrs:   BP Temp Temp src Pulse Resp SpO2   08/05/22 1235 -- -- -- 87 24 98 %   08/05/22 1220 (!) 123/92 -- -- 82 19 93 %   08/05/22 1200 (!) 112/92 -- -- 90 26 95 %   08/05/22 1100 -- -- -- 67 11 95 % "   08/05/22 1000 102/80 -- -- -- -- --   08/05/22 0918 -- -- -- 79 -- --   08/05/22 0914 117/68 98.6  F (37  C) Temporal -- 20 (!) 88 %       Physical Exam  General: Elderly male sitting up on [facility] appears dyspneic.  HENT: Patient wearing face mask. When taken off, mucous membranes appear moist.  Eyes: Conjunctive and sclera clear.  EOMs intact.  PERRLA.  CV: Regular rate and rhythm. Normal S1, S2. No appreciable murmurs, gallops or rubs.  Resp: Rhonchi left lower lobe. No stridor or cough observed.  Dyspneic.  GI: Obese abdomen.  Abdomen soft, non distended and nontender. No rebound or guarding.  MSK: Moves all extremities without difficulty.  Bilateral lower extremity edema, nonpitting.  Skin: Warm and dry.  Neuro: Awake, alert.  Has baseline Alzheimer dementia.  Confused at baseline.  Psych: Cooperative on exam.  Mildly agitated.      Emergency Department Course     ECG results from 08/05/22   EKG 12 lead     Value    Systolic Blood Pressure     Diastolic Blood Pressure     Ventricular Rate 71    Atrial Rate 75    AR Interval     QRS Duration 88        QTc 465    P Axis     R AXIS -2    T Axis 56    Interpretation ECG      Atrial fibrillation  Possible Anterior infarct , age undetermined  Abnormal ECG  No previous ECGs available  Confirmed by GENERATED REPORT, COMPUTER ([id number]),  Eugene Sow ([id number]) on 8/5/2022 10:00:35 AM         Imaging:  Chest XR,  PA & LAT   Final Result   IMPRESSION: Linear and nodular opacity in the right upper lobe may be   due to scarring. Correlation with prior radiographs can be considered   when available or a follow-up chest CT in 8-12 weeks can be   considered. Mild left basilar patchy opacity, either atelectasis or   pneumonia. No pleural effusion or pneumothorax. Mildly enlarged   cardiac silhouette and tortuous thoracic aorta. Right humerus   postoperative changes.      LENA FRANZ MD            SYSTEM ID:  [id number]        Report per  radiology    Laboratory:  Labs Ordered and Resulted from Time of ED Arrival to Time of ED Departure   BASIC METABOLIC PANEL - Abnormal       Result Value    Sodium 139      Potassium 4.7      Chloride 109      Carbon Dioxide (CO2) 24      Anion Gap 6      Urea Nitrogen 16      Creatinine 0.92      Calcium 8.9      Glucose 112 (*)     GFR Estimate 81     ISTAT GASES LACTATE VENOUS POCT - Abnormal    Lactic Acid POCT 0.7      Bicarbonate Venous POCT 29 (*)     O2 Sat, Venous POCT 90 (*)     pCO2V Venous POCT 38 (*)     pH Venous POCT 7.50 (*)     pO2 Venous POCT 53 (*)    TROPONIN I - Normal    Troponin I High Sensitivity 19     INFLUENZA A/B & SARS-COV2 PCR MULTIPLEX - Normal    Influenza A PCR Negative      Influenza B PCR Negative      RSV PCR Negative      SARS CoV2 PCR Negative     NT PROBNP INPATIENT - Normal    N terminal Pro BNP Inpatient 910     CBC WITH PLATELETS AND DIFFERENTIAL    WBC Count 7.5      RBC Count 4.87      Hemoglobin 13.8      Hematocrit 42.3      MCV 87      MCH 28.3      MCHC 32.6      RDW 13.9      Platelet Count 182      % Neutrophils 70      % Lymphocytes 15      % Monocytes 10      % Eosinophils 3      % Basophils 1      % Immature Granulocytes 1      NRBCs per 100 WBC 0      Absolute Neutrophils 5.3      Absolute Lymphocytes 1.1      Absolute Monocytes 0.7      Absolute Eosinophils 0.2      Absolute Basophils 0.1      Absolute Immature Granulocytes 0.1      Absolute NRBCs 0.0     MRSA MSSA PCR, NASAL SWAB       Emergency Department Course:    Reviewed:  I reviewed nursing notes, vitals and MAR from [facility].    Assessments:  0910 I obtained history and examined the patient as noted above.     2131    I contacted the patient's daughter Slime Hernandez  [phone]. She would like us to keep her updated on her dad's status.    102   I discussed the patient's case with his daughter Slime over the phone.  I said the recommendation would be to bring the patient in for admission and start  IV broad-spectrum antibiotics.  However if she declines hospital admission, it sounds like they have oxygen support available at his living facility.  He can continue on Levaquin there and we would add doxycycline to his regimen.  She will discuss with family and get back to me.    1048 I spoke to the patient's daughter Slime who spoke to her family.  Family agreed they would like the patient admitted for IV antibiotics.  I then asked the HUC to page out to a hospitalist. I ordered IV Zosyn.    Consults:   1025   discussed the patient's case with the pharmacist Adele.  The recommendation would be to bring him in for IV antibiotics and start Zosyn.  However since the patient has advanced dementia and is on comfort cares only and DNR, I will first call his daughter Slime and discussed the case.    1101 I spoke to the hospitalist, Dr. Vergara, who accepted the patient for inpatient admission.    1104 I discussed the case again with Pharmacy and let them know the hospitalist service asked that I add on Vancomycin.    Interventions:  Medications   vancomycin 2500 mg in 0.9% NaCl 500 ml intermittent infusion 2,500 mg (2,500 mg Intravenous New Bag 8/5/22 1152)   piperacillin-tazobactam (ZOSYN) 4.5 g vial to attach to  mL bag (0 g Intravenous Stopped 8/5/22 1237)     Disposition:  The patient was admitted to the hospital under the care of Dr. Vergara.     Impression & Plan      Medical Decision Making:  Oscar Eric is a 87 year old male with a history of Alzheimer dementia (confused at baseline), atrial fibrillation (rate controlled on metoprolol, not anticoagulated), CAD, hypertension and hyperlipidemia who presented by ambulance from his living facility [facility] for evaluation of worsening shortness of breath in the context of recently diagnosed pneumonia and failed oral antibiotic therapy per detailed HPI above.  On arrival, the patient was hypoxic at 88% on room air.  He was a poor historian but could tell me  he was having a hard time breathing.  He was placed on 2 L oxygen by nasal cannula.  Chest x-ray did show him to have a left lower lobe pneumonia.  Fortunately CBC did not show leukocytosis and his lactate was not elevated concerning for progression to sepsis.  Other lab studies were unremarkable.  BNP returned within normal limits, no concern for acute heart failure.  Metabolic panel showed no electrolyte derangement or kidney dysfunction.  EKG showed his baseline atrial fibrillation, which was rate controlled and he is on metoprolol.  Troponin was not elevated concerning for acute cardiac ischemia.  I discussed his case at length with his daughter Slime over the phone.  She spoke to family and indicated that they wanted to admit him and agreed to broad-spectrum antibiotics, versus comfort care only.  I did discuss the patient's case with Dr. Barb oliveira of the hospitalist service who graciously agreed to admit him inpatient.    Diagnosis:    ICD-10-CM    1. Pneumonia of left lower lobe due to infectious organism  J18.9    2. Hypoxia  R09.02      Elina PELAYO APC-T  I saw and evaluated this patient under attending provider Dr. Noland.       Elina Jauregui PA-C  08/05/22 1246

## 2022-08-05 NOTE — PROGRESS NOTES
RECEIVING UNIT ED HANDOFF REVIEW    ED Nurse Handoff Report was reviewed by: Gabriella Aranda RN on August 5, 2022 at 12:35 PM

## 2022-08-05 NOTE — PLAN OF CARE
Goal Outcome Evaluation:    A&Ox1-2, forgetful, very [place]. Sleeping between cares. BM x1. Male external cath intact. Tray set up for meals. Diminished lung sounds. MIRANDA. 3 L NC. Receiving IV abx.

## 2022-08-05 NOTE — PHARMACY-ADMISSION MEDICATION HISTORY
Pharmacy Medication History  Admission medication history interview status for the 8/5/2022  admission is complete. See EPIC admission navigator for prior to admission medications     Medication history sources: MAR ([facility])    Significant changes made to the medication list:   Added all medications.     Additional medication history information:   Recent antibiotics   1. Zpack x5d ended 8/1   2. cefpodoxime x7ds ended 8/3   3. Levofloxacin started 8/4 for 7d  Gabapentin increased from 200 mg BID to TID on 8/3  Morphine and ativan new as of 8/4    Medication reconciliation completed by provider prior to medication history? No    Time spent in this activity: 30 minutes     Prior to Admission medications    Medication Sig Last Dose Taking? Auth Provider Long Term End Date   acetaminophen (ARTHRITIS PAIN APAP) 650 MG CR tablet Take 650 mg by mouth 3 times daily 8/5/2022 at 0800 Yes Unknown, Entered By History     acetaminophen (TYLENOL) 325 MG tablet Take 650 mg by mouth every 4 hours as needed for mild pain  Yes Unknown, Entered By History     albuterol (PROVENTIL) (2.5 MG/3ML) 0.083% neb solution Take 2.5 mg by nebulization 2 times daily 8/5/2022 at 0800 Yes Unknown, Entered By History Yes    albuterol (PROVENTIL) (2.5 MG/3ML) 0.083% neb solution Take 2.5 mg by nebulization every 4 hours as needed for shortness of breath / dyspnea or wheezing  Yes Unknown, Entered By History Yes    bisacodyl (DULCOLAX) 10 MG suppository Place 10 mg rectally daily as needed for constipation  Yes Unknown, Entered By History     cholecalciferol (VITAMIN D3) 125 mcg (5000 units) capsule Take 125 mcg by mouth daily 8/5/2022 at 0800 Yes Unknown, Entered By History     donepezil (ARICEPT) 10 MG tablet Take 10 mg by mouth At Bedtime 8/4/2022 at 2000 Yes Unknown, Entered By History     gabapentin (NEURONTIN) 100 MG capsule Take 200 mg by mouth 3 times daily 8/5/2022 at 0800 Yes Unknown, Entered By History Yes    gabapentin  (NEURONTIN) 100 MG capsule Take 100 mg by mouth 4 times daily as needed (agitation/anxiety)  Yes Unknown, Entered By History Yes    hypromellose (ARTIFICIAL TEARS) 0.5 % SOLN ophthalmic solution Place 2 drops into both eyes 3 times daily 8/5/2022 at 0800 Yes Unknown, Entered By History     hypromellose (ARTIFICIAL TEARS) 0.5 % SOLN ophthalmic solution Place 2 drops into both eyes 2 times daily as needed for dry eyes  Yes Unknown, Entered By History     lactobacillus rhamnosus, GG, (CULTURELLE KIDS) packet Take 1 packet by mouth daily 8/5/2022 at 0800 Yes Unknown, Entered By History     levofloxacin (LEVAQUIN) 750 MG tablet Take 750 mg by mouth At Bedtime 8/4/2022 at 2000 Yes Unknown, Entered By History  8/10/22   loperamide (IMODIUM) 2 MG capsule Take 2 mg by mouth 4 times daily as needed for diarrhea  Yes Unknown, Entered By History     LORazepam (ATIVAN) 0.5 MG tablet Take 0.5 mg by mouth every 4 hours as needed for anxiety (restlessness)  Yes Unknown, Entered By History     magnesium hydroxide (MILK OF MAGNESIA) 400 MG/5ML suspension Take 15-30 mLs by mouth daily as needed for constipation or heartburn  Yes Unknown, Entered By History     menthol-zinc oxide (CALMOSEPTINE) 0.44-20.6 % OINT ointment Apply topically 3 times daily as needed for irritation (redness)  Yes Unknown, Entered By History     metoprolol tartrate (LOPRESSOR) 25 MG tablet Take 25 mg by mouth 2 times daily 8/5/2022 at 0800 Yes Unknown, Entered By History Yes    morphine 2.5 MG solu-tab Take 2.5 mg by mouth every 4 hours as needed for moderate to severe pain or shortness of breath / dyspnea  Yes Unknown, Entered By History     nitroGLYcerin (NITROSTAT) 0.4 MG sublingual tablet Place 0.4 mg under the tongue every 5 minutes as needed for chest pain For chest pain place 1 tablet under the tongue every 5 minutes for 3 doses. If symptoms persist 5 minutes after 1st dose call 911.  Yes Unknown, Entered By History Yes    Petrolatum OINT Externally  apply topically 2 times daily Elbows and bilateral legs 8/5/2022 at 0800 Yes Unknown, Entered By History Yes    phenylephrine-mineral oil-petrolatum (PREPARATION H) 0.25-14-74.9 % rectal ointment Place rectally 2 times daily 8/5/2022 at 0800 Yes Unknown, Entered By History     pravastatin (PRAVACHOL) 40 MG tablet Take 40 mg by mouth At Bedtime 8/4/2022 at 2000 Yes Unknown, Entered By History Yes    sertraline (ZOLOFT) 50 MG tablet Take 50 mg by mouth daily 8/5/2022 at 0800 Yes Unknown, Entered By History Yes    tamsulosin (FLOMAX) 0.4 MG capsule Take 0.4 mg by mouth daily 8/5/2022 at 0800 Yes Unknown, Entered By History         The information provided in this note is only as accurate as the sources available at the time of update(s)     Megan Berrios, PharmD, BCCCP

## 2022-08-05 NOTE — PLAN OF CARE
Goal Outcome Evaluation:      Cognitive Concerns/ Orientation : to self only, rambles, occasionally has garbled speech  BEHAVIOR & AGGRESSION TOOL COLOR: green  CIWA SCORE: NA  ABNL VS/O2: on 3 L NC  MOBILITY: baseline WC bound  PAIN MANAGMENT: no signs of pain  BOWEL/BLADDER: incontinent, external male cath applied  ABNL LAB/BG: abnormal abgs  DRAIN/DEVICES: right hand piv x1, external male cath, pcds  TELEMETRY RHYTHM: NA  SKIN: periarea and buttocks rash. Contact dermatis-barrier cream applied. Sacral mepilex applied.  TESTS/PROCEDURES: none  D/C DAY/GOALS/PLACE: 2-3 days  OTHER IMPORTANT INFO: diminished lung sounds, productive cough-spits on the floor. SOB at times. Receiving vanco. Updated daughter Slime. There is an ACP document, family will bring a copy, both daughters are legal medical decision makers per Slime report on the ACP.

## 2022-08-06 NOTE — PROGRESS NOTES
[facility]    Hospitalist Progress Note    Assessment & Plan   Oscar Eric is a 87 year old male with history of advanced Alzheimer's disease living in a nursing home, atrial fibrillation rate controlled with metoprolol not on anticoagulation, coronary artery disease status post a previous stent, hypertension, hyperlipidemia and recent diagnosis of pneumonia treated with outpatient antibiotics who presents via EMS from his living facility ([facility]) for evaluation of worsening shortness of breath and newly requiring oxygen.      Acute respiratory failure.  Nursing home acquired left lower lobe lobe pneumonia with risk factors for Pseudomonas and MRSA failing outpatient antibiotics including azithromycin, cephalosporin, and Levaquin.  Chest x-ray shows left lower lobe pneumonia with some right upper lobe scarring.  Started azithromycin and cephalosporin on July 28 and completed azithromycin course on 8/1 and recently had cephalosporin changed to Levaquin.  Sating ok on RA.  - Cont IV Zosyn and vancomycin  - Continue lactobacillus for C. difficile prevention  - Albuterol nebs every 4 hours as needed  - supportive care including Oxygen to keep O2 sats greater than 90%, wean as tolerated  - Follow cultures.     Advanced dementia.    Bit agitated today..  -Continue home Aricept  -Continue home Zoloft and scheduled/as needed Neurontin  -Avoid Ativan while in the hospital because of risk of delirium  -Delirium bundle.  - Increased supervision if needed.     Chronic atrial fibrillation with controlled ventricular rate and no anticoagulation.  -Patient avoiding anticoagulation at baseline  -Continue home metoprolol with parameters     Benign essential hypertension.  -Continue home metoprolol with parameters     Benign prostatic hypertrophy.  -Continue home Flomax     Mixed hyperlipidemia.  -Continue home Pravachol     History of coronary artery disease with previous stent.  Daughter says she  does not believe he has had a heart attack in the past but had a stent placed because of some chest pains and coronary disease.  -Continue metoprolol, Pravachol and as needed nitroglycerin  -Patient is not on aspirin and can address this with his primary     Chronic arthritis pains.  -Continued scheduled Tylenol 3 times daily with as needed Tylenol     Dry eyes.  -Continue artificial tears           Diet:  Regular diet  DVT Prophylaxis: Enoxaparin (Lovenox) SQ and Pneumatic Compression Devices  Liao Catheter: Not present  Central Lines: None  Cardiac Monitoring: None  Code Status:  DNR/DNI.       Disposition: Pending clinical improvement. Likely several days.    Octavia Foster MD, MD      Interval History   Agitated/confused but cooperative. Denies cp/sob.      Physical Exam   Temp: 97.7  F (36.5  C) Temp src: Axillary BP: (!) 149/88 Pulse: 82   Resp: 20 SpO2: 96 % O2 Device: Nasal cannula Oxygen Delivery: 2 LPM  Vitals:    08/05/22 1424 08/06/22 0400   Weight: 114 kg (251 lb 5.2 oz) 112.5 kg (248 lb 0.3 oz)     Vital Signs with Ranges  Temp:  [97.2  F (36.2  C)-98.6  F (37  C)] 97.7  F (36.5  C)  Pulse:  [63-90] 82  Resp:  [11-26] 20  BP: (102-149)/(68-93) 149/88  SpO2:  [88 %-98 %] 96 %  I/O last 3 completed shifts:  In: 240 [P.O.:240]  Out: 250 [Urine:250]    Constitutional: Agitated, confused.  Respiratory: Clear to auscultation bilaterally, no crackles or wheezing  Cardiovascular: Regular rate and rhythm, normal S1 and S2, and no murmur noted  GI: Normal bowel sounds, soft, non-distended, non-tender  Skin/Integumen: No rashes, no cyanosis, no edema  Other:     Medications       acetaminophen  650 mg Oral TID     carboxymethylcellulose PF  2 drop Both Eyes TID     donepezil  10 mg Oral At Bedtime     enoxaparin ANTICOAGULANT  40 mg Subcutaneous Q24H     gabapentin  200 mg Oral TID     lactobacillus rhamnosus (GG)  1 capsule Oral Daily     metoprolol tartrate  25 mg Oral BID     phenylephrine-mineral  oil-petrolatum   Rectal BID     piperacillin-tazobactam  4.5 g Intravenous Q6H     pravastatin  40 mg Oral At Bedtime     sertraline  50 mg Oral Daily     sodium chloride (PF)  3 mL Intracatheter Q8H     tamsulosin  0.4 mg Oral Daily     vancomycin  750 mg Intravenous Q12H       Data   Recent Labs   Lab 08/05/22  0930   WBC 7.5   HGB 13.8   MCV 87         POTASSIUM 4.7   CHLORIDE 109   CO2 24   BUN 16   CR 0.92   ANIONGAP 6   BRITTNY 8.9   *       Recent Results (from the past 24 hour(s))   Chest XR,  PA & LAT    Narrative    XR CHEST 2 VIEWS 8/5/2022 10:03 AM    HISTORY: Dyspnea, hx of pneumonia    COMPARISON: None available      Impression    IMPRESSION: Linear and nodular opacity in the right upper lobe may be  due to scarring. Correlation with prior radiographs can be considered  when available or a follow-up chest CT in 8-12 weeks can be  considered. Mild left basilar patchy opacity, either atelectasis or  pneumonia. No pleural effusion or pneumothorax. Mildly enlarged  cardiac silhouette and tortuous thoracic aorta. Right humerus  postoperative changes.    LENA FRANZ MD         SYSTEM ID:  [id number]

## 2022-08-06 NOTE — PLAN OF CARE
Goal Outcome Evaluation:    Plan of Care Reviewed With: patient     Overall Patient Progress: no change    Summary: Worsening SOB, treating pneumonia. Hx of alzheimers  DATE & TIME: 8/6/22 4232-1193  Cognitive Concerns/Orientation: A&O to self (baseline), rambles, garbled speech.  BEHAVIOR & AGGRESSION TOOL COLOR: Green, but confused and restless at times.  CIWA SCORE: N/A  ABNL VS/O2: Weaned to 1 L NC and sats in 90s.  MOBILITY: baseline WC bound  PAIN MANAGMENT: Denies/restless at times. On scheduled tylenol.  BOWEL/BLADDER: Incontinent, brief in place, soft BM x2.  ABNL LAB/BG: No new labs today.  DRAIN/DEVICES: R PIV pulled out by patient, new PIV in place.  TELEMETRY RHYTHM: N/A  SKIN: Periarea and buttocks rash. Sacral mepilex in place.  TESTS/PROCEDURES: None  D/C DAY/GOALS/PLACE: 2-3 days pending respiratory progress and placement.  OTHER IMPORTANT INFO: Productive cough, dyspneic on exertion. Stood w/PT today but still very unsteady on feet.

## 2022-08-06 NOTE — PLAN OF CARE
Goal Outcome Evaluation:      Summary: Worsening SOB, treating pneumonia. Hx of alzheimer's  DATE & TIME: 8/5/22-8/6/22 2601-2726  Cognitive Concerns/ Orientation : A/O to self (baseline), rambles, occasionally has garbled speech  BEHAVIOR & AGGRESSION TOOL COLOR: green  CIWA SCORE: NA  ABNL VS/O2: weaned to 2 L NC and sats in 90's  MOBILITY: baseline WC bound  PAIN MANAGMENT: Denies/ restless at times.  BOWEL/BLADDER: incontinent, external male cath in place, Soft BM x1  ABNL LAB/BG: abnormal abgs  DRAIN/DEVICES: right hand piv x1, external male cath, pcds  TELEMETRY RHYTHM: NA  SKIN: periarea and buttocks rash. Contact dermatis- Sacral mepilex in place  TESTS/PROCEDURES: none  D/C DAY/GOALS/PLACE: 2-3 days  OTHER IMPORTANT INFO: diminished,expiratory wheezes, productive cough-dyspneic at times. Receiving intermittent Vanco/Zosyn

## 2022-08-06 NOTE — PROGRESS NOTES
08/06/22 1016   Quick Adds   Type of Visit Initial PT Evaluation       Present no   Living Environment   People in Home facility resident   Current Living Arrangements assisted living   Home Accessibility no concerns   Transportation Anticipated none   Living Environment Comments Very hard to obtain subjective information due to dementia and aphasia. Infomred by nurse that pt is a facility resident, most likely LTC.   Self-Care   Usual Activity Tolerance poor   Current Activity Tolerance poor   Regular Exercise No   Equipment Currently Used at Home wheelchair, manual   Fall history within last six months   (Unable to answer)   Activity/Exercise/Self-Care Comment Pt reported no regular exercise, is WC bound at [facility].   General Information   Onset of Illness/Injury or Date of Surgery 08/05/22   Referring Physician Jason Vergara MD   Patient/Family Therapy Goals Statement (PT) Pt repeatedly stated he wanted to stand.   Pertinent History of Current Problem (include personal factors and/or comorbidities that impact the POC) Oscar Eric is a 87 year old male with history of advanced Alzheimer's disease living in a nursing home, atrial fibrillation rate controlled with metoprolol not on anticoagulation, coronary artery disease status post a previous stent, hypertension, hyperlipidemia and recent diagnosis of pneumonia treated with outpatient antibiotics who presents via EMS from his living facility ([facility]) for evaluation of worsening shortness of breath and newly requiring oxygen.   Cognition   Cognitive Status Comments AOx1 (self)   Pain Assessment   Patient Currently in Pain No   Range of Motion (ROM)   Range of Motion ROM deficits secondary to weakness   Strength (Manual Muscle Testing)   Strength (Manual Muscle Testing) Deficits observed during functional mobility   Bed Mobility   Comment, (Bed Mobility) Mod-MaxAx1 for bed mobility   Transfers   Comment, (Transfers) Mod-MaxAx2  for STS   Sensory Examination   Sensory Perception patient reports no sensory changes   Clinical Impression   Criteria for Skilled Therapeutic Intervention Yes, treatment indicated   PT Diagnosis (PT) Impaired functional mobility   Influenced by the following impairments Decreased strength, activity tolerance   Functional limitations due to impairments Bed mobility, transfers   Clinical Presentation (PT Evaluation Complexity) Stable/Uncomplicated   Clinical Presentation Rationale Clinical judgement   Clinical Decision Making (Complexity) low complexity   Planned Therapy Interventions (PT) balance training;bed mobility training;gait training;neuromuscular re-education;patient/family education;ROM (range of motion);strengthening;stair training;stretching;transfer training;wheelchair management/propulsion training;progressive activity/exercise   Anticipated Equipment Needs at Discharge (PT) wheelchair  (Pt is WC bound at baseline but kept stating he needed a WC)   Risk & Benefits of therapy have been explained evaluation/treatment results reviewed;care plan/treatment goals reviewed;risks/benefits reviewed;current/potential barriers reviewed;participants voiced agreement with care plan;participants included;patient   PT Discharge Planning   PT Discharge Recommendation (DC Rec) Long term care facility   PT Rationale for DC Rec Pt presents to PT below baseline with impaired functional mobioity due to decreased strength and activity tolerance. Obtaining subjective information was very difficult during eval due to aphasia and cognitive deficits from dementia and past stroke. Per chart pt came from a facility and is WC bound at baseline. Pt was able to roll left and right in bed with MaxAx1. Pt was able to transfer from supine>sitting EOB MaxAx1. However, pt could maintain sitting EOB Ana Maria w/assist of BUE. Pt could perform a STS Mod-MaxAx2. This level of mobility seems below his baseline, but only slightly. Recommend pt  returns to LTC for 24/7 supervision/assist when medically able.   PT Brief overview of current status Mod-MaxAx1 for bed mobility, Mod-MaxAx2 for STS   Total Evaluation Time   Total Evaluation Time (Minutes) 15   Physical Therapy Goals   PT Frequency 2x/week   PT Predicted Duration/Target Date for Goal Attainment 08/10/22   PT Goals Bed Mobility;Transfers   PT: Bed Mobility Independent   PT: Transfers Minimal assist;Sit to/from stand;Assistive device

## 2022-08-07 NOTE — PLAN OF CARE
Goal Outcome Evaluation:    Summary: Pneumonia, worsening SOB. Hx of alzheimers  DATE & TIME: 8/6/22-8/7/22 3685-6895  Cognitive Concerns/Orientation: A&O to self (baseline), rambles, garbled speech.  BEHAVIOR & AGGRESSION TOOL COLOR: Green, but confused and restless at times.   CIWA SCORE: N/A  ABNL VS/O2: HTN other VSS with 94% on 2.5L  MOBILITY: Baseline WC bound  PAIN MANAGMENT: Denies/restless at times. On scheduled tylenol.  BOWEL/BLADDER: Incontinent, brief in place. Large BM that was loose. Another that was formed   ABNL LAB/BG: NA  DRAIN/DEVICES: R PIV pulled out by patient 8/6/22, new R PIV in place.  TELEMETRY RHYTHM: N/A  SKIN: Periarea and buttocks rash, barrier cream applied. Sacral mepilex in place.  TESTS/PROCEDURES: None  D/C DAY/GOALS/PLACE: 2-3 days pending placement and respiratory improvement.  OTHER IMPORTANT INFO: Productive cough, dyspneic on exertion.

## 2022-08-07 NOTE — PLAN OF CARE
Goal Outcome Evaluation:    Plan of Care Reviewed With: patient      Summary: Pneumonia, worsening SOB. Hx of alzheimers  DATE & TIME: 8/6/22 4200-8647  Cognitive Concerns/Orientation: A&O to self (baseline), rambles, garbled speech.  BEHAVIOR & AGGRESSION TOOL COLOR: Green, but confused and restless at times.   CIWA SCORE: N/A  ABNL VS/O2: HTN other VSS with 98% RA  MOBILITY: Baseline WC bound  PAIN MANAGMENT: Denies/restless at times. On scheduled tylenol.  BOWEL/BLADDER: Incontinent, brief in place. No BM this shift  ABNL LAB/BG: NA  DRAIN/DEVICES: R PIV pulled out by patient, new R PIV in place.  TELEMETRY RHYTHM: N/A  SKIN: Periarea and buttocks rash, barrier cream applied. Sacral mepilex in place.  TESTS/PROCEDURES: None  D/C DAY/GOALS/PLACE: 2-3 days pending placement and respiratory improvement.  OTHER IMPORTANT INFO: Productive cough, dyspneic on exertion.

## 2022-08-07 NOTE — PLAN OF CARE
Goal Outcome Evaluation:  Summary: Pneumonia, worsening SOB. Hx of alzheimers  DATE & TIME: 8/7/22 4782-4122  Cognitive Concerns/Orientation: A&O to self (baseline), rambles, garbled speech.  BEHAVIOR & AGGRESSION TOOL COLOR: Green, but confused and restless at times.   CIWA SCORE: N/A  ABNL VS/O2: HTN other VSS with 94% on 2.5L  MOBILITY: Baseline WC bound  PAIN MANAGMENT: Denies/restless at times. On scheduled tylenol.  BOWEL/BLADDER: Incontinent, brief in place. Large BM that was loose   ABNL LAB/BG: NA  DRAIN/DEVICES: R SL  TELEMETRY RHYTHM: N/A  SKIN: Periarea and buttocks rash, barrier cream applied. Sacral mepilex in place.  TESTS/PROCEDURES: None  D/C DAY/GOALS/PLACE: 2-3 days pending placement and respiratory improvement.  OTHER IMPORTANT INFO: Productive cough, dyspneic on exertion.

## 2022-08-07 NOTE — PROGRESS NOTES
[facility]    Hospitalist Progress Note    Assessment & Plan   Oscar Eric is a 87 year old male with history of advanced Alzheimer's disease living in a nursing home, atrial fibrillation rate controlled with metoprolol not on anticoagulation, coronary artery disease status post a previous stent, hypertension, hyperlipidemia and recent diagnosis of pneumonia treated with outpatient antibiotics who presents via EMS from his living facility ([facility]) for evaluation of worsening shortness of breath and newly requiring oxygen.      Acute respiratory failure.  Nursing home acquired left lower lobe lobe pneumonia with risk factors for Pseudomonas and MRSA failing outpatient antibiotics including azithromycin, cephalosporin, and Levaquin.  Chest x-ray shows left lower lobe pneumonia with some right upper lobe scarring.  Started azithromycin and cephalosporin on July 28 and completed azithromycin course on 8/1 and recently had cephalosporin changed to Levaquin.  Sating ok on RA.  Improved sxs.   - Cont IV Zosyn, discontinue vanc today.  - Continue lactobacillus for C. difficile prevention  - Albuterol nebs every 4 hours as needed  - supportive care including Oxygen to keep O2 sats greater than 90%.  - Follow cultures.     Advanced dementia.    Calmer this am.  -Continue home Aricept  -Continue home Zoloft and scheduled/as needed Neurontin  -Avoid Ativan while in the hospital because of risk of delirium  -Delirium bundle.  - Increased supervision if needed.     Chronic atrial fibrillation with controlled ventricular rate and no anticoagulation.  -Patient avoiding anticoagulation at baseline  -Continue home metoprolol with parameters     Benign essential hypertension.  -Continue home metoprolol with parameters     Benign prostatic hypertrophy.  -Continue home Flomax     Mixed hyperlipidemia.  -Continue home Pravachol     History of coronary artery disease with previous stent.  Daughter says she  does not believe he has had a heart attack in the past but had a stent placed because of some chest pains and coronary disease.  -Continue metoprolol, Pravachol and as needed nitroglycerin  -Patient is not on aspirin and can address this with his primary     Chronic arthritis pains.  -Continued scheduled Tylenol 3 times daily with as needed Tylenol     Dry eyes.  -Continue artificial tears           Diet:  Regular diet  DVT Prophylaxis: Enoxaparin (Lovenox) SQ and Pneumatic Compression Devices  Liao Catheter: Not present  Central Lines: None  Cardiac Monitoring: None  Code Status:  DNR/DNI.       Disposition: Pending clinical improvement. Likely 1-2 days.    Octavia Foster MD, MD      Interval History   Calm and cooperative today. Coughing. Denies cp/sob.      Physical Exam   Temp: 97.8  F (36.6  C) Temp src: Axillary BP: (!) 147/83 Pulse: 75   Resp: 18 SpO2: 95 % O2 Device: Nasal cannula Oxygen Delivery: 2.5 LPM  Vitals:    08/05/22 1424 08/06/22 0400 08/07/22 0115   Weight: 114 kg (251 lb 5.2 oz) 112.5 kg (248 lb 0.3 oz) 113 kg (249 lb 1.9 oz)     Vital Signs with Ranges  Temp:  [97.6  F (36.4  C)-98.2  F (36.8  C)] 97.8  F (36.6  C)  Pulse:  [75-80] 75  Resp:  [18-20] 18  BP: (136-152)/(79-89) 147/83  SpO2:  [94 %-97 %] 95 %  I/O last 3 completed shifts:  In: 120 [P.O.:120]  Out: 200 [Urine:200]    Constitutional:consfused. Calm.  Respiratory: Clear to auscultation bilaterally, no crackles or wheezing  Cardiovascular: Regular rate and rhythm, normal S1 and S2, and no murmur noted  GI: Normal bowel sounds, soft, non-distended, non-tender  Skin/Integumen: No rashes, no cyanosis, no edema  Other:     Medications       acetaminophen  650 mg Oral TID     carboxymethylcellulose PF  2 drop Both Eyes TID     donepezil  10 mg Oral At Bedtime     enoxaparin ANTICOAGULANT  40 mg Subcutaneous Q24H     gabapentin  200 mg Oral TID     lactobacillus rhamnosus (GG)  1 capsule Oral Daily     metoprolol tartrate  25 mg Oral BID      phenylephrine-mineral oil-petrolatum   Rectal BID     piperacillin-tazobactam  4.5 g Intravenous Q6H     pravastatin  40 mg Oral At Bedtime     sertraline  50 mg Oral Daily     sodium chloride (PF)  3 mL Intracatheter Q8H     tamsulosin  0.4 mg Oral Daily     vancomycin  750 mg Intravenous Q12H       Data   Recent Labs   Lab 08/07/22  0751 08/06/22  0851 08/05/22  0930   WBC 5.0 3.9* 7.5   HGB 13.8 13.5 13.8   MCV 89 89 87    164 182   NA  --  141 139   POTASSIUM  --  4.0 4.7   CHLORIDE  --  109 109   CO2  --  27 24   BUN  --  14 16   CR  --  1.11 0.92   ANIONGAP  --  5 6   BRITTNY  --  8.8 8.9   GLC  --  104* 112*       No results found for this or any previous visit (from the past 24 hour(s)).

## 2022-08-08 NOTE — PROVIDER NOTIFICATION
MD Notification    Notified Person: MD    Notified Person Name: Dr. Madrid    Notification Date/Time: 8/7/22 4612    Notification Interaction: Text-paged    Purpose of Notification: Pt having frequent coughing episodes, can you place an order for cough syrup?    Orders Received:    Comments:

## 2022-08-08 NOTE — PLAN OF CARE
Summary: Pneumonia, worsening SOB. Hx of alzheimers  DATE & TIME: 8/7/22-8/8/22 0183-2936   Cognitive Concerns/Orientation: A&O to self (baseline), rambles, garbled speech.  BEHAVIOR & AGGRESSION TOOL COLOR: Green, but confused and restless at times.   CIWA SCORE: N/A  ABNL VS/O2: VSS on 2L NC overnight  MOBILITY: Baseline WC bound, restless and repositions self in bed. Lift/T/R  PAIN MANAGMENT: Denies pain, on scheduled Tylenol. Nonverbal indicators of pain absent.   BOWEL/BLADDER: Incontinent, male external catheter in place. No BM.   ABNL LAB/BG: N/A  DRAIN/DEVICES: R PIV SL with int ABX  TELEMETRY RHYTHM: N/A  SKIN: Periarea and buttocks rash, barrier cream applied. Sacral mepilex in place.  TESTS/PROCEDURES: None  D/C DAY/GOALS/PLACE: 2-3 days pending placement and respiratory improvement.  OTHER IMPORTANT INFO: Productive cough, dyspneic on exertion. On scheduled lovenox. Q6h IV zosyn.

## 2022-08-08 NOTE — PROGRESS NOTES
08/08/22 1058   General Information   Onset of Illness/Injury or Date of Surgery 08/05/22   Referring Physician Dr. Balbuena   Patient/Family Therapy Goal Statement (SLP) Patient did not state.   Pertinent History of Current Problem Oscar Eric is a 87 year old male with history of advanced Alzheimer's disease living in a nursing home, atrial fibrillation rate controlled with metoprolol not on anticoagulation, coronary artery disease status post a previous stent, hypertension, hyperlipidemia and recent diagnosis of pneumonia treated with outpatient antibiotics who presents via EMS from his living facility ([facility]) for evaluation of worsening shortness of breath and newly requiring oxygen.   General Observations Pleasant cooperative and [place].   Past History of Dysphagia No documented history found   Type of Evaluation   Type of Evaluation Swallow Evaluation   Oral Motor   Oral Musculature unable to assess due to poor participation/comprehension   Structural Abnormalities none present   Mucosal Quality adequate   Dentition (Oral Motor)   Dentition (Oral Motor) adequate dentition;significant number of missing teeth   Vocal Quality/Secretion Management (Oral Motor)   Vocal Quality (Oral Motor) WFL   General Swallowing Observations   Respiratory Support (General Swallowing Observations) nasal cannula;supplemental oxygen   Current Diet/Method of Nutritional Intake (General Swallowing Observations, NIS) regular diet;thin liquids (level 0)   Swallowing Evaluation Clinical swallow evaluation   Clinical Swallow Evaluation   Feeding Assistance frequent cues/help required   Clinical Swallow Evaluation Textures Trialed thin liquids;mildly thick liquids;pureed;solid foods   Clinical Swallow Eval: Thin Liquid Texture Trial   Mode of Presentation, Thin Liquids cup;self-fed   Volume of Liquid or Food Presented 3 oz of water   Oral Phase of Swallow premature pharyngeal entry   Pharyngeal Phase of Swallow impaired;repeated  swallows;throat clearing   Diagnostic Statement Delayed intermittent throat clearing. can not rule ot silent aspiration.   Clinical Swallow Eval: Mildly Thick Liquids   Mode of Presentation cup;self-fed   Volume Presented 4 swallows   Oral Phase premature pharyngeal entry   Pharyngeal Phase impaired;repeated swallows;throat clearing   Diagnostic Statement Delayed throat clearing. Similiar to thin liquids.   Clinical Swallow Evaluation: Puree Solid Texture Trial   Mode of Presentation, Puree spoon;fed by clinician   Volume of Puree Presented 4 teaspoons of apple sauce   Oral Phase, Puree WFL   Pharyngeal Phase, Puree intact   Clinical Swallow Evaluation: Solid Food Texture Trial   Mode of Presentation self-fed   Volume Presented 1 saltine cracker   Oral Phase impaired mastication;residue in oral cavity;premature pharyngeal entry   Oral Residue soft palate;mid posterior tongue   Pharyngeal Phase impaired;reduction in laryngeal movement;repeated swallows;throat clearing   Diagnostic Statement Delayed throat clearing that increased with time.   Esophageal Phase of Swallow   Patient reports or presents with symptoms of esophageal dysphagia Yes   Esophageal comments Throat clearing and suspect some esophageal component.   Swallowing Recommendations   Diet Consistency Recommendations easy to chew (level 7);thin liquids (level 0)   Supervision Level for Intake close supervision needed   Mode of Delivery Recommendations bolus size, small;food moistened;no straws;slow rate of intake   Postural Recommendations none   Swallowing Maneuver Recommendations alternate food and liquid intake   Monitoring/Assistance Required (Eating/Swallowing) check mouth frequently for oral residue/pocketing;stop eating activities when fatigue is present;monitor for cough or change in vocal quality with intake   Recommended Feeding/Eating Techniques (Swallow Eval) maintain upright sitting position for eating;maintain upright posture during/after  eating for 30 minutes;minimize distractions during oral intake;provide assist with feeding;set-up and prepare tray   Medication Administration Recommendations, Swallowing (SLP) As tolerated.   Instrumental Assessment Recommendations VFSS (videofluoroscopic swallowing study)   Comment, Swallowing Recommendations Fully assess pharyngeal function and determine least restrictive diet.   General Therapy Interventions   Planned Therapy Interventions Dysphagia Treatment   Dysphagia treatment Modified diet education;Instruction of safe swallow strategies   Clinical Impression   Criteria for Skilled Therapeutic Interventions Met (SLP Eval) Yes, treatment indicated   SLP Diagnosis Mild to moderate oral and pharyngeal dysphagia   Risks & Benefits of therapy have been explained evaluation/treatment results reviewed;care plan/treatment goals reviewed;risks/benefits reviewed;current/potential barriers reviewed;participants voiced agreement with care plan;participants included;patient   Clinical Impression Comments Patient presents with mild to moderate oral and pharyngeal dysphagia at bedside. Patient demonstrated premature entry of thin liquids via the cup with delayed intermittent throat clearing. Suspect penetration and or esophageal involvement. Similiar results with mildly thick liquids via the cup. Mastication was sufficient for solids with mildly reduced bolus control resulting in mild oral residue that was reduced with additional swallows followed by a liquid rinse. Noted to have increased throat clearing 5 minutes after eating questioning esophageal dysfunction and silent aspiration can not be ruled out.     Recommend: 1. downgrade diet to IDDSI level 7 easy to chew with thin liquids. 2. Upright, no straws, small bites/sips, slow rate and alternate liquids/solids. 3. Video swallow study pending ok from family.     SLP Discharge Planning   SLP Discharge Recommendation Long term care facility   SLP Rationale for DC Rec  Patient's swallow function is below his baseline.   SLP Brief overview of current status  Mild to moderate oral and pharyngeal dysphagia. Will complete a video swallow study 8/9/22 pending family approval.    Total Evaluation Time   Total Evaluation Time (Minutes) 20

## 2022-08-08 NOTE — PROGRESS NOTES
[facility]  Hospitalist Progress Note    When I evaluated patient: 08/08/2022    Assessment & Plan        Oscar Eric is a 87 year old male with history of advanced Alzheimer's disease living in a nursing home, Afib not on anticoagulation, CAD, HTN, HL and recent diagnosis of pneumonia treated with outpatient antibiotics who was brought by EMS from his living facility ([facility]) for evaluation of worsening shortness of breath and new requirement of oxygen.      Acute hypoxic respiratory failure.  HCAP, LLL suspect bacterial pneumonia, failed outpatient antibiotics: CXR with LLL  pneumonia with some RUL scarring.  Started azithromycin and cephalosporin on July 28 and completed azithromycin course on 8/1 and recently had cephalosporin changed to Levaquin. Saturating ok on RA. WBC normal, LA negative.   - started on vanc and zosyn in ER, now continued with IV zosyn alone  - has remained afebrile, stable O2 need of 1 LPM now  - Continue lactobacillus for C. difficile prevention  - Albuterol nebs every 4 hours as needed  - supportive care including Oxygen to keep O2 sats greater than 90%.     Advanced dementia.    -Continue home Aricept  -Continue home Zoloft and scheduled/as needed Neurontin  -Avoid Ativan while in the hospital because of risk of delirium  -Delirium precaution and Increased supervision if needed.     Chronic atrial fibrillation with controlled ventricular rate and no anticoagulation.  Weakness, Rt>Lt  -Patient avoiding anticoagulation at baseline  -Continue home metoprolol with parameters  -patient appears to have garbled speech, also weak with decreased co ordination but more so on Rt. Risk of CVA given a fib but no AC  - MRI brain ordered  - Given pneumonia, SLP eval requested, level 7 diet with thin liquid     Benign essential hypertension.  -Continue home metoprolol with parameters     Benign prostatic hypertrophy.  -Continue home Flomax     Mixed  hyperlipidemia.  -Continue home Pravachol     History of coronary artery disease with previous stent.  Daughter mentioned she does not believe he has had a heart attack in the past but had a stent placed because of some chest pains and coronary disease.  -Continue metoprolol, Pravachol and as needed nitroglycerin  -Patient is not on aspirin and can address this with his primary     Chronic arthritis pains.  -Continued scheduled Tylenol 3 times daily with as needed Tylenol     Dry eyes.  -Continue artificial tears     Diet:  Regular diet  DVT Prophylaxis: Enoxaparin (Lovenox) SQ and Pneumatic Compression Devices  Liao Catheter: Not present  Central Lines: None  Cardiac Monitoring: None  Code Status:  DNR/DNI.     Disposition: Pending above work up and pending clinical improvement. Likely 1-2 days.    Hiro Balbuena MD   Hospitalist      Interval History   Care resumed, chart reviewed, discussed with nursing staff and evaluated patient this am. patient seems very hard of hearing. Somewhat garbled speech but is usual per RN. Weak in general but weaker on Rt than Lt. Poor limb co ordination.   - noted increased spell of cough last night, per nursing, post nasal drainage noted  - patient denies chest pain, reports he feels short of breath often.    - remains afebrile      Physical Exam   Temp: 97.6  F (36.4  C) Temp src: Axillary BP: 135/88 Pulse: 81   Resp: 18 SpO2: 95 % O2 Device: Nasal cannula Oxygen Delivery: 1 LPM  Vitals:    08/05/22 1424 08/06/22 0400 08/07/22 0115   Weight: 114 kg (251 lb 5.2 oz) 112.5 kg (248 lb 0.3 oz) 113 kg (249 lb 1.9 oz)     Vital Signs with Ranges  Temp:  [97.4  F (36.3  C)-98.1  F (36.7  C)] 97.6  F (36.4  C)  Pulse:  [70-85] 81  Resp:  [18] 18  BP: (120-151)/(79-91) 135/88  SpO2:  [92 %-96 %] 95 %  I/O last 3 completed shifts:  In: 360 [P.O.:360]  Out: 350 [Urine:350]    Constitutional:consfused. Calm.  Respiratory: Clear to auscultation bilaterally, no crackles or  wheezing  Cardiovascular:  S1 s2 irregular, no murmur or tachycardia  GI: Normal bowel sounds, soft, non-distended, non-tender  Skin/Integumen: No rashes, no cyanosis, no edema  Neuro: alert slurred speech, weak in general, Rt>Lt, decreased limb co ordination.     Medications       acetaminophen  650 mg Oral TID     carboxymethylcellulose PF  2 drop Both Eyes TID     donepezil  10 mg Oral At Bedtime     enoxaparin ANTICOAGULANT  40 mg Subcutaneous Q24H     gabapentin  200 mg Oral TID     lactobacillus rhamnosus (GG)  1 capsule Oral Daily     metoprolol tartrate  25 mg Oral BID     phenylephrine-mineral oil-petrolatum   Rectal BID     piperacillin-tazobactam  4.5 g Intravenous Q6H     pravastatin  40 mg Oral At Bedtime     sertraline  50 mg Oral Daily     sodium chloride (PF)  3 mL Intracatheter Q8H     tamsulosin  0.4 mg Oral Daily       Data   Recent Labs   Lab 08/08/22  1031 08/07/22  0751 08/06/22  0851 08/05/22  0930   WBC  --  5.0 3.9* 7.5   HGB  --  13.8 13.5 13.8   MCV  --  89 89 87    168 164 182   NA  --  142 141 139   POTASSIUM  --  3.7 4.0 4.7   CHLORIDE  --  110* 109 109   CO2  --  26 27 24   BUN  --  13 14 16   CR 1.01 1.02 1.11 0.92   ANIONGAP  --  6 5 6   BRITTNY  --  8.6 8.8 8.9   GLC  --  99 104* 112*       No results found for this or any previous visit (from the past 24 hour(s)).

## 2022-08-08 NOTE — PLAN OF CARE
Goal Outcome Evaluation:  Cognitive Concerns/Orientation: A&O to self (baseline), rambles, occasionally has garbled speech.  BEHAVIOR & AGGRESSION TOOL COLOR: Green  CIWA SCORE: N/A  ABNL VS/O2: VSS, attempted room air but was 89% a times, placed back on 1 L NC  MOBILITY: Baseline WC bound, restless. Able to turn and repo with assistance  PAIN MANAGMENT: Denies pain, on scheduled Tylenol.   BOWEL/BLADDER: Incontinent. BM x2   ABNL LAB/BG: N/A  DRAIN/DEVICES: R PIV SL x2  TELEMETRY RHYTHM: N/A  SKIN: Periarea and buttocks rash, barrier cream applied. Sacral mepilex for protection  TESTS/PROCEDURES: MRI  D/C DAY/GOALS/PLACE: 1-2 pending placement   OTHER IMPORTANT INFO: Nonproductive cough, MIRANDA. Clears his throat intermittently. MRI of head to r/o stroke. Speech evaluated patient-adjusted diet to easy to chew foods. Updated daughter Slime that patient will have MRI, she stated that he's informed her a week and a half ago that he feels like he's had a stroke-not evaluated at the time.

## 2022-08-09 NOTE — PROGRESS NOTES
[facility]  Hospitalist Progress Note    When I evaluated patient: 08/09/2022    Assessment & Plan        Oscar Eric is a 87 year old male with history of advanced Alzheimer's disease living in a nursing home, Afib not on anticoagulation, CAD, HTN, HL and recent diagnosis of pneumonia treated with outpatient antibiotics who was brought by EMS from his living facility ([facility]) for evaluation of worsening shortness of breath and new requirement of oxygen.      Acute hypoxic respiratory failure.  HCAP, LLL suspect bacterial pneumonia, failed outpatient antibiotics: CXR with LLL  pneumonia with some RUL scarring.  Started azithromycin and cephalosporin on July 28 and completed azithromycin course on 8/1 and recently had cephalosporin changed to Levaquin. WBC normal, LA negative.   - Started on vanc and zosyn in ER, then continued with IV zosyn alone  - Remained afebrile, stable O2 need of 1 LPM    - Placed on lactobacillus for C. difficile prevention  - Albuterol nebs every 4 hours as needed  - supportive care including Oxygen to keep O2 sats greater than 90%.    Respiratory status is stable, SLP eval as well completed, given right-sided weakness, MRI brain was pursued, shows mass concerning for malignancy, query GBM/lymphoma.  Discussed with patient's daughter Slime.  She requested hospice and comfort cares which is very appropriate.  - for hospice consult  -Medications reviewed, antibiotic discontinued.     Advanced dementia.    -PTA was on Aricept.  Medication discontinued now that he is comfort measures  -Continue home Zoloft and scheduled/as needed Neurontin      Chronic atrial fibrillation with controlled ventricular rate and no anticoagulation.  Weakness, Rt>Lt due to brain mass suspected malignancy  -Patient was not on anticoagulation at baseline  -PTA was on metoprolol   -MRI brain shows brain mass, worrisome for malignancy, lymphoma versus GBM.  Discussed further  options with patient's daughter but she would like to pursue hospice and comfort cares.   Benign essential hypertension.  Mixed hyperlipidemia.  History of coronary artery disease with previous stent     Benign prostatic hypertrophy.  -Continue home Flomax      Chronic arthritis pains.  -Continued scheduled Tylenol 3 times daily with as needed Tylenol     Dry eyes.  -Continue artificial tears     Diet:  Regular diet  DVT Prophylaxis: Enoxaparin (Lovenox) SQ and Pneumatic Compression Devices  Liao Catheter: Not present  Central Lines: None  Cardiac Monitoring: None  Code Status:  DNR/DNI.     Disposition: Plan is hospice consult and discharge back to care facility.    Hiro Balbuena MD   Hospitalist      Interval History     Patient has slurred speech, slow to answer, denied pain or dyspnea.  Hard of hearing.  -No acute issues reported by nursing staff  -MRI brain finding noted, discussed with patient's daughter.  She requested comfort measures which is very appropriate at this time.       Physical Exam   Temp: 97.6  F (36.4  C) Temp src: Oral BP: (!) 147/99 Pulse: 80   Resp: 18 SpO2: 97 % O2 Device: Nasal cannula Oxygen Delivery: 1 LPM  Vitals:    08/05/22 1424 08/06/22 0400 08/07/22 0115   Weight: 114 kg (251 lb 5.2 oz) 112.5 kg (248 lb 0.3 oz) 113 kg (249 lb 1.9 oz)     Vital Signs with Ranges  Temp:  [97.6  F (36.4  C)-98.7  F (37.1  C)] 97.6  F (36.4  C)  Pulse:  [67-80] 80  Resp:  [18-20] 18  BP: (134-150)/(77-99) 147/99  SpO2:  [96 %-97 %] 97 %  I/O last 3 completed shifts:  In: 360 [P.O.:360]  Out: -     Constitutional: Consfused. Calm. Hard of hearing.   Respiratory: Clear to auscultation bilaterally anteriorly, crackles present over the lung bases.  Normal work of breathing.  Cardiovascular:  S1 s2 irregular, no murmur or tachycardia  GI: Normal bowel sounds, soft, non-distended, non-tender  Skin/Integumen: No rashes, no cyanosis, no edema  Neuro: alert, oriented to self, has slurred speech, weak in  general, Rt>Lt, decreased limb co ordination.     Medications       acetaminophen  650 mg Oral TID     carboxymethylcellulose PF  2 drop Both Eyes TID     gabapentin  200 mg Oral TID     lactobacillus rhamnosus (GG)  1 capsule Oral Daily     phenylephrine-mineral oil-petrolatum   Rectal BID     sertraline  50 mg Oral Daily     sodium chloride (PF)  10 mL Intracatheter Q8H     sodium chloride (PF)  3 mL Intracatheter Q8H     tamsulosin  0.4 mg Oral Daily       Data   Recent Labs   Lab 08/09/22  0749 08/08/22  1031 08/07/22  0751 08/06/22  0851 08/05/22  0930   WBC  --   --  5.0 3.9* 7.5   HGB  --   --  13.8 13.5 13.8   MCV  --   --  89 89 87   PLT  --  168 168 164 182   NA  --   --  142 141 139   POTASSIUM  --   --  3.7 4.0 4.7   CHLORIDE  --   --  110* 109 109   CO2  --   --  26 27 24   BUN  --   --  13 14 16   CR 0.97 1.01 1.02 1.11 0.92   ANIONGAP  --   --  6 5 6   BRITTNY  --   --  8.6 8.8 8.9   GLC  --   --  99 104* 112*       Recent Results (from the past 24 hour(s))   MR Brain w/o & w Contrast    Narrative    EXAM: MR BRAIN W/O and W CONTRAST  LOCATION: [facility]  DATE/TIME: 8/8/2022 4:55 PM    INDICATION: Right weakness, slurred speech, pneumonia, known AFib, concern for stroke.  COMPARISON: None.  CONTRAST: 11 mL Gadavist  TECHNIQUE: Routine multiplanar multisequence head MRI without and with intravenous contrast.    FINDINGS:  INTRACRANIAL CONTENTS: There is a lobulated, multifocal lesion in the left frontal/parietal white matter measuring approximately 5 x 3.1 x 4.6 cm (AP by TR by CC). There are internal areas of necrosis. There is a 3 mm satellite lesion in the left   parietal white matter as well as a tract leading to an enhancing 1 cm nodular lesion in the region of the left cerebral peduncle and internal capsule. There are a couple of internal areas of associated restricted diffusion. There is a separate lesion   overlying the left gyrus rectus anteriorly which is relatively  circumscribed, measuring 14 x 8 x 10 mm. No associated edema. There is a 2.5 mm left to right midline patchy and confluent nonspecific T2/FLAIR hyperintensities within the cerebral white   matter most consistent with moderate chronic microvascular ischemic change. No hydrocephalus. Normal position of the cerebellar tonsils.    SELLA: No abnormality accounting for technique.    OSSEOUS STRUCTURES/SOFT TISSUES: Normal marrow signal. The major intracranial vascular flow voids are maintained.     ORBITS: No abnormality accounting for technique.     SINUSES/MASTOIDS: No paranasal sinus mucosal disease. No middle ear or mastoid effusion.       Impression    IMPRESSION:  1.  Lobulated multifocal lesion centered in the left frontal/parietal centrum semiovale and corona radiata with moderate associated vasogenic edema. This demonstrates relatively prominent enhancement with some internal areas of presumed necrosis. Primary   differential includes a multifocal primary lesion such as glioblastoma or lymphoma.  2.  There is an additional smaller enhancing lesion anterior to the left gyrus rectus measuring up to 1.4 cm without associated edema. This could represent an additional focus of the multifocal tumor in the left frontal/parietal lobe, a small incidental   meningioma, or raises the possibility that the lesions in the left cerebral hemisphere represent metastatic disease, though this is felt to be less likely.

## 2022-08-09 NOTE — CONSULTS
Care Management Initial Consult    General Information  Assessment completed with: Other, Family, Crystal DON at [facility] and dtrAlmaz Palencia  Type of CM/SW Visit: Initial Assessment    Primary Care Provider verified and updated as needed:     Readmission within the last 30 days:        Reason for Consult: end of life/hospice  Advance Care Planning:            Communication Assessment  Patient's communication style: spoken language (English or Bilingual)    Hearing Difficulty or Deaf: yes   Wear Glasses or Blind: no    Cognitive  Cognitive/Neuro/Behavioral: .WDL except  Level of Consciousness: confused  Arousal Level: opens eyes spontaneously  Orientation: disoriented to, place, time, situation  Mood/Behavior: calm, cooperative  Best Language: 0 - No aphasia  Speech: garbled    Living Environment:   People in home:       Current living Arrangements: assisted living  Name of Facility: [facility]   Able to return to prior arrangements: yes       Family/Social Support:  Care provided by:    Provides care for: no one, unable/limited ability to care for self     Children          Description of Support System: Supportive, Involved    Two daughters, Slime lives in [place] and the other dtr. Lives in [place].  Slmie reports she will arriving on Friday to be with her father.        Current Resources:   Patient receiving home care services: No     Community Resources:    Equipment currently used at home: wheelchair, manual  Supplies currently used at home:      Employment/Financial:  Employment Status:          Financial Concerns:             Lifestyle & Psychosocial Needs:  Social Determinants of Health     Tobacco Use: Medium Risk     Smoking Tobacco Use: Former Smoker     Smokeless Tobacco Use: Unknown   Alcohol Use: Not on file   Financial Resource Strain: Not on file   Food Insecurity: Not on file   Transportation Needs: Not on file   Physical Activity: Not on file   Stress: Not on file   Social Connections:  Not on file   Intimate Partner Violence: Not on file   Depression: Not on file   Housing Stability: Not on file       Functional Status:  Prior to admission patient needed assistance:   Dependent ADLs:: Incontinence, Grooming, Eating, Dressing, Bathing  Dependent IADLs:: Cleaning, Cooking, Laundry, Shopping, Meal Preparation, Medication Management, Money Management, Transportation  Assesssment of Functional Status: At functional baseline    Mental Health Status:  Mental Health Status: No Current Concerns       Chemical Dependency Status:  Chemical Dependency Status: No Current Concerns             Values/Beliefs:  Spiritual, Cultural Beliefs, Spiritism Practices, Values that affect care:                 Additional Information:  Patient lives in the all male memory care unit at [facility]. [phone].  Clinical information has been faxed to GEORGIE Rojas,  at [facility] . Crystal is not concerned if we provide patient a 1:1 while in the hospital.    She is aware of the plan of care for hospice and spoke with patient's daughter Slime who is the decision maker.    Slime is requesting to Crystal that patient return to [facility] with the support of [facility]   Referral made with nataliia Cat at [facility] [phone] and fax clinical information to their office [phone].   [facility] will contact dtrAlmaz Palencia and can open patient to services tomorrow. Patient already has a hospital bed.  Crystal is requesting a Broda chair and oxygen if needed for comfort.   [facility] requests that patient be discharged with two days of comfort medications.  The medications cannot have ranges or be liquid. Writer has updated Dr Sree [facility] will transport at 1000 via stretcher and oxygen.        Emperatriz Mcgee, Northern Light Maine Coast HospitalSW

## 2022-08-09 NOTE — PLAN OF CARE
Goal Outcome Evaluation:    Summary: Pneumonia, worsening SOB. Hx of alzheimers  DATE & TIME: 8/8/22 9059-5621  Cognitive Concerns/Orientation: A&O to self (baseline), rambles, occasionally has garbled speech.  BEHAVIOR & AGGRESSION TOOL COLOR: Green  CIWA SCORE: N/A  ABNL VS/O2: VSS on 1L NC  MOBILITY: Baseline WC bound, restless. Able to turn and repo with assistance  PAIN MANAGMENT: Denies pain, on scheduled Tylenol.   BOWEL/BLADDER: Incontinent. No BM this shift  ABNL LAB/BG: N/A  DRAIN/DEVICES: R PIV SL x2  TELEMETRY RHYTHM: N/A  SKIN: Periarea and buttocks rash, barrier cream applied. Sacral mepilex for protection  TESTS/PROCEDURES: MRI of brain this afternoon  D/C DAY/GOALS/PLACE: 1-2 pending placement   OTHER IMPORTANT INFO: Nonproductive cough, MIRANDA. Clears his throat intermittently. MRI of head to r/o stroke. Speech evaluated patient-adjusted diet to easy to chew food

## 2022-08-09 NOTE — PLAN OF CARE
Goal Outcome Evaluation:    Plan of Care Reviewed With: patient     Overall Patient Progress: no change    Summary: Pneumonia, worsening SOB. Hx of alzheimers.  DATE & TIME: 8/9/22 3363-6035  Cognitive Concerns/Orientation: A&O to self (baseline), rambles, occasionally garbled speech.  BEHAVIOR & AGGRESSION TOOL COLOR: Green  CIWA SCORE: N/A  ABNL VS/O2: VSS on 1L NC  MOBILITY: Baseline WC bound, restless. Able to turn and repo with assistance.  PAIN MANAGMENT: Denies pain; on scheduled Tylenol.   BOWEL/BLADDER: Incontinent of bowel and bladder, x1 loose BM this shift.  ABNL LAB/BG: N/A  DRAIN/DEVICES: R PIV SL x1  TELEMETRY RHYTHM: N/A  SKIN: Periarea and buttocks rash, barrier cream applied. Sacral mepilex in place.  TESTS/PROCEDURES: S/P MRI of brain 8/8/22. Stroke ruled out, but multifocal brain lesions found.  D/C DAY/GOALS/PLACE: Tentatively tomoroow (8/10) back to AL facility w/hospice service.  OTHER IMPORTANT INFO: Comfort cares ordered for patient. Nonproductive cough, MIRANDA. Clears his throat intermittently.  Gave PRN PO ativan 1mg x1 for anxiety. Wheeled around unit in recliner x2. Continued to insist on standing up despite unsteadiness.

## 2022-08-09 NOTE — PLAN OF CARE
Speech Language Therapy Discharge Summary    Reason for therapy discharge:    Patient/family request discontinuation of services.    Progress towards therapy goal(s). See goals on Care Plan in [facility] electronic health record for goal details.  Goals not met.  Barriers to achieving goals:   Comfort cares.    Therapy recommendation(s):    No further therapy is recommended.

## 2022-08-09 NOTE — PLAN OF CARE
Goal Outcome Evaluation:    Summary: Pneumonia, worsening SOB. Hx of alzheimers  DATE & TIME: 8/8/22-8/9/22 3589-1728  Cognitive Concerns/Orientation: A&O to self (baseline), rambles, occasionally has garbled speech.  BEHAVIOR & AGGRESSION TOOL COLOR: Green  CIWA SCORE: N/A  ABNL VS/O2: VSS on 1L NC  MOBILITY: Baseline WC bound, restless. Able to turn and repo with assistance  PAIN MANAGMENT: Denies pain, on scheduled Tylenol.   BOWEL/BLADDER: Incontinent. No BM this shift   ABNL LAB/BG: N/A  DRAIN/DEVICES: R PIV SL x2  TELEMETRY RHYTHM: N/A  SKIN: Periarea and buttocks rash, barrier cream applied. Sacral mepilex for protection  TESTS/PROCEDURES: MRI of brain 8/8/22  D/C DAY/GOALS/PLACE: 1-2 pending placement   OTHER IMPORTANT INFO: Nonproductive cough, MIRANDA. Clears his throat intermittently. MRI of head to r/o stroke. Speech evaluated patient-adjusted diet to easy to chew foods.     Gave PRN Gabapentin x1 for agitation/ anxiety - wasn't sleeping through the night

## 2022-08-10 NOTE — DISCHARGE SUMMARY
[facility]  Hospitalist Discharge Summary      Date of Admission:  8/5/2022  Date of Discharge:  8/10/2022  Discharging Provider: Hiro Balbuena MD  Discharge Service: Hospitalist Service    Discharge Diagnoses     Please refer to the hospital course below    Follow-ups Needed After Discharge   Follow-up Appointments     Follow-up and recommended labs and tests       Hospice care will follow up after discharge               Unresulted Labs Ordered in the Past 30 Days of this Admission     No orders found from 7/6/2022 to 8/6/2022.      These results will be followed up by none    Discharge Disposition   Discharged to [facility]  Condition at discharge: Guarded     Hospital Course   Oscar Eric is a 87 year old male with history of advanced Alzheimer's disease living in a nursing home, Afib not on anticoagulation, CAD, HTN, HL and recent diagnosis of pneumonia treated with outpatient antibiotics who was brought by EMS from his living facility ([facility]) for evaluation of worsening shortness of breath and new requirement of oxygen.      Acute hypoxic respiratory failure.  HCAP, LLL suspect bacterial pneumonia, failed outpatient antibiotics: CXR with LLL  pneumonia with some RUL scarring.  Started azithromycin and cephalosporin on July 28 and completed azithromycin course on 8/1 and recently had cephalosporin changed to Levaquin. WBC normal, LA negative.   - Started on vanc and zosyn in ER, then continued with IV zosyn alone  - Remained afebrile, stable O2 need of 1 LPM    - Placed on lactobacillus for C. difficile prevention  - Albuterol nebs every 4 hours as needed  - supportive care including Oxygen to keep O2 sats greater than 90%.    Respiratory status remained stable, SLP eval as well completed, given right-sided weakness/poor co ordination, MRI brain was obtained, showed mass concerning for malignancy-> GBM vs lymphoma.  Discussed with patient's daughter Slime at length.  She requested  no further work up and asked for hospice and comfort cares which is very appropriate.  - consulted for hospice consult, and arranged and discharged back to his care facility with comfort measures.   -Medications reviewed, antibiotic and other non essential medications discontinued.     Advanced dementia.    -PTA was on Aricept.  Medication discontinued now that he is comfort measures  -On Zoloft and scheduled/as needed Neurontin, may discontinue eventually with hospice     Chronic atrial fibrillation with controlled ventricular rate and no anticoagulation.  Weakness, Rt>Lt due to brain mass suspected malignancy  -Patient was not on anticoagulation at baseline  -PTA was on metoprolol   -MRI brain shows brain mass, worrisome for malignancy, lymphoma versus GBM.  Discussed further options with patient's daughter but she would like to pursue hospice and comfort cares.   Benign essential hypertension.  Mixed hyperlipidemia.  History of coronary artery disease with previous stent     Benign prostatic hypertrophy.  -On Flomax      Chronic arthritis pains.  -Continued scheduled Tylenol 3 times daily with as needed Tylenol     Dry eyes.  -Continue artificial tears     Hiro Balbuena MD   Hospitalist        Consultations This Hospital Stay   PHARMACY TO DOSE VANCO  CARE MANAGEMENT / SOCIAL WORK IP CONSULT  PHYSICAL THERAPY ADULT IP CONSULT  PHARMACY TO DOSE VANCO  PHARMACY IP CONSULT  VASCULAR ACCESS ADULT IP CONSULT  SPEECH LANGUAGE PATH ADULT IP CONSULT  CARE MANAGEMENT / SOCIAL WORK IP CONSULT    Code Status   No CPR- Do NOT Intubate    Time Spent on this Encounter   I, Hiro Balbuena MD, personally saw the patient today and spent greater than 30 minutes discharging this patient.       Hiro Balbuena MD  [place] [address] MEDICAL SPECIALTY UN[address]  [facility] [place] [address]  Phone: [phone]  ______________________________________________________________________    Physical  Exam   Vital Signs: Temp: 98.2  F (36.8  C) Temp src: Oral BP: (!) 146/106 Pulse: 69   Resp: 18 SpO2: 94 % O2 Device: Nasal cannula Oxygen Delivery: 1 LPM  Weight: 255 lbs 4.68 oz    Constitutional: Consfused. Calm. Hard of hearing.   Respiratory: Clear to auscultation bilaterally anteriorly, crackles present over the lung bases.  Normal work of breathing.  Cardiovascular:  S1 s2 irregular, no murmur or tachycardia  GI: Normal bowel sounds, soft, non-distended, non-tender  Skin/Integumen: No rashes, no cyanosis, no edema  Neuro: alert, oriented to self, has slurred speech, weak in general, Rt>Lt, decreased limb co ordination.        Primary Care Physician   Physician No Ref-Primary    Discharge Orders      Reason for your hospital stay    Pneumonia, brain mass     Follow-up and recommended labs and tests     Hospice care will follow up after discharge     Activity    Your activity upon discharge: activity as tolerated     Diet    Follow this diet upon discharge: Orders Placed This Encounter      Combination Diet Easy to Chew (level 7); Thin Liquids (level 0) (no straws)       Significant Results and Procedures   Most Recent 3 CBC's:Recent Labs   Lab Test 08/08/22  1031 08/07/22  0751 08/06/22  0851 08/05/22  0930   WBC  --  5.0 3.9* 7.5   HGB  --  13.8 13.5 13.8   MCV  --  89 89 87    168 164 182     Most Recent 3 BMP's:Recent Labs   Lab Test 08/09/22  0749 08/08/22  1031 08/07/22  0751 08/06/22  0851 08/05/22  0930   NA  --   --  142 141 139   POTASSIUM  --   --  3.7 4.0 4.7   CHLORIDE  --   --  110* 109 109   CO2  --   --  26 27 24   BUN  --   --  13 14 16   CR 0.97 1.01 1.02 1.11 0.92   ANIONGAP  --   --  6 5 6   BRITTNY  --   --  8.6 8.8 8.9   GLC  --   --  99 104* 112*     Most Recent 2 LFT's:No lab results found.  Most Recent 6 Bacteria Isolates From Any Culture (See EPIC Reports for Culture Details):No lab results found.  Most Recent TSH and T4:No lab results found.  Most Recent Urinalysis:No lab results  found.,   Results for orders placed or performed during the hospital encounter of 08/05/22   Chest XR,  PA & LAT    Narrative    XR CHEST 2 VIEWS 8/5/2022 10:03 AM    HISTORY: Dyspnea, hx of pneumonia    COMPARISON: None available      Impression    IMPRESSION: Linear and nodular opacity in the right upper lobe may be  due to scarring. Correlation with prior radiographs can be considered  when available or a follow-up chest CT in 8-12 weeks can be  considered. Mild left basilar patchy opacity, either atelectasis or  pneumonia. No pleural effusion or pneumothorax. Mildly enlarged  cardiac silhouette and tortuous thoracic aorta. Right humerus  postoperative changes.    LENA FRANZ MD         SYSTEM ID:  [id number]   MR Brain w/o & w Contrast    Narrative    EXAM: MR BRAIN W/O and W CONTRAST  LOCATION: [facility]  DATE/TIME: 8/8/2022 4:55 PM    INDICATION: Right weakness, slurred speech, pneumonia, known AFib, concern for stroke.  COMPARISON: None.  CONTRAST: 11 mL Gadavist  TECHNIQUE: Routine multiplanar multisequence head MRI without and with intravenous contrast.    FINDINGS:  INTRACRANIAL CONTENTS: There is a lobulated, multifocal lesion in the left frontal/parietal white matter measuring approximately 5 x 3.1 x 4.6 cm (AP by TR by CC). There are internal areas of necrosis. There is a 3 mm satellite lesion in the left   parietal white matter as well as a tract leading to an enhancing 1 cm nodular lesion in the region of the left cerebral peduncle and internal capsule. There are a couple of internal areas of associated restricted diffusion. There is a separate lesion   overlying the left gyrus rectus anteriorly which is relatively circumscribed, measuring 14 x 8 x 10 mm. No associated edema. There is a 2.5 mm left to right midline patchy and confluent nonspecific T2/FLAIR hyperintensities within the cerebral white   matter most consistent with moderate chronic microvascular ischemic change.  No hydrocephalus. Normal position of the cerebellar tonsils.    SELLA: No abnormality accounting for technique.    OSSEOUS STRUCTURES/SOFT TISSUES: Normal marrow signal. The major intracranial vascular flow voids are maintained.     ORBITS: No abnormality accounting for technique.     SINUSES/MASTOIDS: No paranasal sinus mucosal disease. No middle ear or mastoid effusion.       Impression    IMPRESSION:  1.  Lobulated multifocal lesion centered in the left frontal/parietal centrum semiovale and corona radiata with moderate associated vasogenic edema. This demonstrates relatively prominent enhancement with some internal areas of presumed necrosis. Primary   differential includes a multifocal primary lesion such as glioblastoma or lymphoma.  2.  There is an additional smaller enhancing lesion anterior to the left gyrus rectus measuring up to 1.4 cm without associated edema. This could represent an additional focus of the multifocal tumor in the left frontal/parietal lobe, a small incidental   meningioma, or raises the possibility that the lesions in the left cerebral hemisphere represent metastatic disease, though this is felt to be less likely.       Discharge Medications   Current Discharge Medication List      START taking these medications    Details   acetaminophen (TYLENOL) 650 MG suppository Place 1 suppository (650 mg) rectally every 6 hours as needed for mild pain, other or fever (and adjunct with moderate or severe pain or per patient request)  Qty: 10 suppository, Refills: 0    Associated Diagnoses: Comfort measures only status      artificial saliva (BIOTENE MT) SOLN solution Take 2 mLs (2 sprays) by mouth every hour as needed for dry mouth  Qty: 44.3 mL, Refills: 0    Associated Diagnoses: Comfort measures only status      atropine 1 % ophthalmic solution Place 2 drops under the tongue every 4 hours as needed for secretions  Qty: 5 mL, Refills: 0    Associated Diagnoses: Comfort measures only status       haloperidol (HALDOL) 2 MG tablet Take 1 tablet (2 mg) by mouth every 6 hours as needed for agitation  Qty: 10 tablet, Refills: 0    Associated Diagnoses: Comfort measures only status      HYDROmorphone (DILAUDID) 2 MG tablet Take 0.5 tablets (1 mg) by mouth every 2 hours as needed for moderate to severe pain (or dyspnea)  Qty: 20 tablet, Refills: 0    Associated Diagnoses: Comfort measures only status         CONTINUE these medications which have CHANGED    Details   bisacodyl (DULCOLAX) 10 MG suppository Place 1 suppository (10 mg) rectally daily as needed for constipation  Qty: 5 suppository, Refills: 0    Associated Diagnoses: Comfort measures only status      LORazepam (ATIVAN) 1 MG tablet Take 1 tablet (1 mg) by mouth every 3 hours as needed for anxiety or nausea  Qty: 10 tablet, Refills: 0    Associated Diagnoses: Comfort measures only status         CONTINUE these medications which have NOT CHANGED    Details   acetaminophen (ARTHRITIS PAIN APAP) 650 MG CR tablet Take 650 mg by mouth 3 times daily      albuterol (PROVENTIL) (2.5 MG/3ML) 0.083% neb solution Take 2.5 mg by nebulization every 4 hours as needed for shortness of breath / dyspnea or wheezing      !! gabapentin (NEURONTIN) 100 MG capsule Take 200 mg by mouth 3 times daily      !! gabapentin (NEURONTIN) 100 MG capsule Take 100 mg by mouth 4 times daily as needed (agitation/anxiety)      !! hypromellose (ARTIFICIAL TEARS) 0.5 % SOLN ophthalmic solution Place 2 drops into both eyes 3 times daily      !! hypromellose (ARTIFICIAL TEARS) 0.5 % SOLN ophthalmic solution Place 2 drops into both eyes 2 times daily as needed for dry eyes      loperamide (IMODIUM) 2 MG capsule Take 2 mg by mouth 4 times daily as needed for diarrhea      magnesium hydroxide (MILK OF MAGNESIA) 400 MG/5ML suspension Take 15-30 mLs by mouth daily as needed for constipation or heartburn      menthol-zinc oxide (CALMOSEPTINE) 0.44-20.6 % OINT ointment Apply topically 3 times daily  as needed for irritation (redness)      Petrolatum OINT Externally apply topically 2 times daily Elbows and bilateral legs      phenylephrine-mineral oil-petrolatum (PREPARATION H) 0.25-14-74.9 % rectal ointment Place rectally 2 times daily      sertraline (ZOLOFT) 50 MG tablet Take 50 mg by mouth daily      tamsulosin (FLOMAX) 0.4 MG capsule Take 0.4 mg by mouth daily       !! - Potential duplicate medications found. Please discuss with provider.      STOP taking these medications       acetaminophen (TYLENOL) 325 MG tablet Comments:   Reason for Stopping:         cholecalciferol (VITAMIN D3) 125 mcg (5000 units) capsule Comments:   Reason for Stopping:         donepezil (ARICEPT) 10 MG tablet Comments:   Reason for Stopping:         lactobacillus rhamnosus, GG, (CULTURELLE KIDS) packet Comments:   Reason for Stopping:         levofloxacin (LEVAQUIN) 750 MG tablet Comments:   Reason for Stopping:         metoprolol tartrate (LOPRESSOR) 25 MG tablet Comments:   Reason for Stopping:         morphine 2.5 MG solu-tab Comments:   Reason for Stopping:         nitroGLYcerin (NITROSTAT) 0.4 MG sublingual tablet Comments:   Reason for Stopping:         pravastatin (PRAVACHOL) 40 MG tablet Comments:   Reason for Stopping:             Allergies   No Known Allergies

## 2022-08-10 NOTE — PROGRESS NOTES
Care Management Discharge Note    Discharge Date: 08/10/2022       Discharge Disposition:  (return to [facility])    Discharge Services:  (hospice agency)    Discharge DME:      Discharge Transportation:  ([facility] BLS at 1000 on 8/10)    Private pay costs discussed:     PAS Confirmation Code:  (n/a)  Patient/family educated on Medicare website which has current facility and service quality ratings:      Education Provided on the Discharge Plan:  yes  Persons Notified of Discharge Plans: to daughter lSime yesterday  Patient/Family in Agreement with the Plan: yes    Handoff Referral Completed: No    Additional Information:  Patient discharged as planned. Orders were sent to both [facility] and [facility] prior to discharge.   Medications went with patient.  PCS and face sheet faxed to [facility].  BLS transport was necessary as patient is on oxygen and unable to regulate.  He also can be implusive and had an attendant here.        ELY ConcepcionSW

## 2022-08-10 NOTE — PLAN OF CARE
DATE & TIME: 8/9/2022 8668-5490   Cognitive Concerns/ Orientation : A&O to self only (baseline). [place]. Rambles, & occasionally garbled speech.    BEHAVIOR & AGGRESSION TOOL COLOR: green   CIWA SCORE: na   ABNL VS/O2: On 1L NC for comfort.   MOBILITY: Able to T&R w/ assistance. Up w/ lift. OOB to chair x1.   PAIN MANAGMENT: Denies pain, has sched tylenol.   DIET: Easy to chew; thin liquids (no straw), fair appetite.   BOWEL/BLADDER: Incont B/B, low UOP. No BM during shift.   ABNL LAB/BG: na  DRAIN/DEVICES: R PIV SL   TELEMETRY RHYTHM: na  SKIN: Melissa-area rash. Sacral mepilex in place. Scattered bruising.   TESTS/PROCEDURES: MRI of brain shows mass.   D/C DAY/GOALS/PLACE: Returning to [facility] tomorrow @1000 w/ hospice.   OTHER IMPORTANT INFO: No PRN ativan needed. Sleeping for half of shift, able to arouse.

## 2022-08-10 NOTE — PROGRESS NOTES
Discharge    Patient discharged to [facility] via [facility] Transport/stretcher/on O2 with belongings.  Care plan note entered.    Listed belongings gathered and given to patient (including from security/pharmacy). Yes  Care Plan and Patient education resolved: Yes  Prescriptions if needed, hard copies sent with patient  Yes  Medication Bin checked and emptied on discharge Yes  SW/care coordinator/charge RN aware of discharge: Yes

## 2022-08-10 NOTE — PLAN OF CARE
Goal Outcome Evaluation:    Plan of Care Reviewed With: patient     Summary: Pneumonia, worsening SOB. Hx of alzheimers.    DATE & TIME: 8/9-08/10 6091-5051  Cognitive Concerns/Orientation: A&O to self (baseline), rambles, occasionally garbled speech, [place]  BEHAVIOR & AGGRESSION TOOL COLOR: Green  CIWA SCORE: N/A  ABNL VS/O2: on 1L NC for comfort - on comfort cares   MOBILITY: Baseline WC bound, Able to turn and repo with assistance.  PAIN MANAGMENT: Denies pain; on scheduled Tylenol.   BOWEL/BLADDER: Incontinent of bowel and bladder, Bmx1   ABNL LAB/BG: N/A  DRAIN/DEVICES: R PIV SL x1  TELEMETRY RHYTHM: N/A  SKIN: Periarea and buttocks rash. Sacral mepilex in place for protection. Scattered bruising   TESTS/PROCEDURES: S/P MRI of brain 8/8/22. Stroke ruled out, but multifocal brain lesions found.  D/C DAY/GOALS/PLACE: Discharging today at 10AM, [facility] to transport via stretcher back to AL facility ([facility]) w/ hospice service.  OTHER IMPORTANT INFO: Nonproductive cough, MIRANDA. Clears his throat intermittently. Pt started to become restless towards end of shift. Up with lift to chair per pt request, then brought back to bed. Ativan given x1 for anxiety.

## 2022-08-10 NOTE — PLAN OF CARE
Goal Outcome Evaluation:    Plan of Care Reviewed With: patient     Overall Patient Progress: no change    Summary: Pneumonia, worsening SOB. Hx of alzheimers.  DATE & TIME: 08/10/22 4578-6399  Cognitive Concerns/Orientation: A&O to self (baseline), rambles, occasionally garbled speech, [place]  BEHAVIOR & AGGRESSION TOOL COLOR: Green  CIWA SCORE: N/A  ABNL VS/O2: Pt on 1L NC for comfort - on comfort cares.   MOBILITY: Baseline WC bound, able to turn and repo with assistance. Lift.  PAIN MANAGMENT: Denies pain; on scheduled Tylenol.   BOWEL/BLADDER: Incontinent of bowel and bladder.  ABNL LAB/BG: N/A  DRAIN/DEVICES: R PIV SL x1  TELEMETRY RHYTHM: N/A  SKIN: Periarea and buttocks rash. Sacral mepilex in place for protection. Scattered bruising   TESTS/PROCEDURES: S/P MRI of brain 8/8/22. Stroke ruled out, but multifocal brain lesions found.  D/C DAY/GOALS/PLACE: Discharging today at 10AM, [facility] transport via stretcher back to AL facility ([facility]) w/ hospice service.  OTHER IMPORTANT INFO: Nonproductive cough, MIRANDA. Clears his throat intermittently.

## 2022-08-11 NOTE — PROGRESS NOTES
[facility]    Background: Care Coordination referral placed from [facility] discharge report for reason of patient meeting criteria for a TCM outreach call by [facility] team.    Assessment: Upon chart review, [facility] Team member will cancel/close the referral for TCM outreach due to reason below:    Patient has been discharged with Hospice Care    Plan: Care Coordination referral for TCM outreach canceled.      AARON Choudhary  [phone]      *Connected Care Resource Team does NOT follow patient ongoing. Referrals are identified based on internal discharge reports and the outreach is to ensure patient has an understanding of their discharge instructions.

## 2022-08-16 ENCOUNTER — DOCUMENTATION ONLY (OUTPATIENT)
Dept: OTHER | Facility: CLINIC | Age: 87
End: 2022-08-16